# Patient Record
Sex: FEMALE | Race: WHITE | NOT HISPANIC OR LATINO | Employment: OTHER | URBAN - METROPOLITAN AREA
[De-identification: names, ages, dates, MRNs, and addresses within clinical notes are randomized per-mention and may not be internally consistent; named-entity substitution may affect disease eponyms.]

---

## 2024-01-03 PROBLEM — R55 SYNCOPE, VASOVAGAL: Status: ACTIVE | Noted: 2024-01-03

## 2024-01-03 PROBLEM — I47.19 PAROXYSMAL ATRIAL TACHYCARDIA (CMS-HCC): Status: ACTIVE | Noted: 2024-01-03

## 2024-01-03 PROBLEM — Z95.818 STATUS POST PLACEMENT OF IMPLANTABLE LOOP RECORDER: Status: ACTIVE | Noted: 2024-01-03

## 2024-01-03 PROBLEM — R07.89 CHEST TIGHTNESS: Status: ACTIVE | Noted: 2024-01-03

## 2024-01-03 PROBLEM — I65.29 CAROTID ARTERY STENOSIS: Status: ACTIVE | Noted: 2024-01-03

## 2024-01-03 PROBLEM — R00.2 PALPITATIONS: Status: ACTIVE | Noted: 2024-01-03

## 2024-01-03 PROBLEM — Z86.79 H/O ORTHOSTATIC HYPOTENSION: Status: ACTIVE | Noted: 2024-01-03

## 2024-01-03 PROBLEM — R06.02 SOB (SHORTNESS OF BREATH) ON EXERTION: Status: ACTIVE | Noted: 2024-01-03

## 2024-01-03 PROBLEM — I10 BENIGN ESSENTIAL HYPERTENSION: Status: ACTIVE | Noted: 2024-01-03

## 2024-01-03 RX ORDER — CHOLECALCIFEROL (VITAMIN D3) 25 MCG
1 TABLET ORAL DAILY
COMMUNITY

## 2024-01-03 RX ORDER — CHLORHEXIDINE GLUCONATE 4 %
3 LIQUID (ML) TOPICAL NIGHTLY PRN
COMMUNITY

## 2024-01-03 RX ORDER — FLUTICASONE PROPIONATE 50 MCG
1 SPRAY, SUSPENSION (ML) NASAL DAILY
COMMUNITY

## 2024-01-03 RX ORDER — ASCORBIC ACID 500 MG
1 TABLET ORAL DAILY
COMMUNITY

## 2024-01-03 RX ORDER — UBIDECARENONE 75 MG
500 CAPSULE ORAL DAILY
COMMUNITY

## 2024-01-03 RX ORDER — APIXABAN 5 MG/1
1 TABLET, FILM COATED ORAL 2 TIMES DAILY
COMMUNITY
End: 2024-01-23 | Stop reason: SDUPTHER

## 2024-01-03 RX ORDER — METOPROLOL SUCCINATE 50 MG/1
25 TABLET, EXTENDED RELEASE ORAL DAILY
COMMUNITY

## 2024-01-03 RX ORDER — VITS A,C,E/LUTEIN/MINERALS 300MCG-200
200 TABLET ORAL DAILY
COMMUNITY

## 2024-01-16 ENCOUNTER — APPOINTMENT (OUTPATIENT)
Dept: CARDIOLOGY | Facility: CLINIC | Age: 79
End: 2024-01-16
Payer: MEDICARE

## 2024-01-16 ENCOUNTER — APPOINTMENT (OUTPATIENT)
Dept: CARDIOLOGY | Facility: HOSPITAL | Age: 79
End: 2024-01-16
Payer: MEDICARE

## 2024-01-23 ENCOUNTER — HOSPITAL ENCOUNTER (OUTPATIENT)
Dept: CARDIOLOGY | Facility: HOSPITAL | Age: 79
Discharge: HOME | End: 2024-01-23
Payer: MEDICARE

## 2024-01-23 ENCOUNTER — OFFICE VISIT (OUTPATIENT)
Dept: CARDIOLOGY | Facility: CLINIC | Age: 79
End: 2024-01-23
Payer: MEDICARE

## 2024-01-23 VITALS
HEIGHT: 63 IN | BODY MASS INDEX: 28 KG/M2 | WEIGHT: 158 LBS | DIASTOLIC BLOOD PRESSURE: 62 MMHG | HEART RATE: 77 BPM | SYSTOLIC BLOOD PRESSURE: 108 MMHG

## 2024-01-23 DIAGNOSIS — I47.19 PAROXYSMAL ATRIAL TACHYCARDIA (CMS-HCC): Primary | ICD-10-CM

## 2024-01-23 DIAGNOSIS — I10 BENIGN ESSENTIAL HYPERTENSION: ICD-10-CM

## 2024-01-23 DIAGNOSIS — R55 SYNCOPE, VASOVAGAL: ICD-10-CM

## 2024-01-23 DIAGNOSIS — T50.1X5S: ICD-10-CM

## 2024-01-23 DIAGNOSIS — R55 SYNCOPE AND COLLAPSE: ICD-10-CM

## 2024-01-23 DIAGNOSIS — Z86.79 H/O ORTHOSTATIC HYPOTENSION: ICD-10-CM

## 2024-01-23 DIAGNOSIS — R07.89 CHEST TIGHTNESS: ICD-10-CM

## 2024-01-23 DIAGNOSIS — R00.2 PALPITATIONS: ICD-10-CM

## 2024-01-23 DIAGNOSIS — R06.02 SOB (SHORTNESS OF BREATH) ON EXERTION: ICD-10-CM

## 2024-01-23 DIAGNOSIS — Z95.818 STATUS POST PLACEMENT OF IMPLANTABLE LOOP RECORDER: ICD-10-CM

## 2024-01-23 PROCEDURE — 3074F SYST BP LT 130 MM HG: CPT | Performed by: NURSE PRACTITIONER

## 2024-01-23 PROCEDURE — 93291 INTERROG DEV EVAL SCRMS IP: CPT | Performed by: INTERNAL MEDICINE

## 2024-01-23 PROCEDURE — 3078F DIAST BP <80 MM HG: CPT | Performed by: NURSE PRACTITIONER

## 2024-01-23 PROCEDURE — 1036F TOBACCO NON-USER: CPT | Performed by: NURSE PRACTITIONER

## 2024-01-23 PROCEDURE — 99214 OFFICE O/P EST MOD 30 MIN: CPT | Performed by: NURSE PRACTITIONER

## 2024-01-23 PROCEDURE — 1159F MED LIST DOCD IN RCRD: CPT | Performed by: NURSE PRACTITIONER

## 2024-01-23 PROCEDURE — 93291 INTERROG DEV EVAL SCRMS IP: CPT

## 2024-01-23 RX ORDER — APIXABAN 5 MG/1
5 TABLET, FILM COATED ORAL 2 TIMES DAILY
Qty: 180 TABLET | Refills: 3 | Status: SHIPPED | OUTPATIENT
Start: 2024-01-23 | End: 2025-01-22

## 2024-01-23 NOTE — PROGRESS NOTES
"CARDIOLOGY OFFICE VISIT      CHIEF COMPLAINT  Chief Complaint   Patient presents with    Syncope     Complaint: \"I had a spell last Friday where I became weak and my heart began to race.\"  HISTORY OF PRESENT ILLNESS  HPI  History: The patient is a 78-year-old  female who is followed for paroxysmal atrial tachycardia and atrial fibrillation controlled on beta-blockade, magnesium oxide, and anticoagulated with Eliquis.  She underwent implantation of a loop recorder on 2020 for correlation of arrhythmia to symptom.  She presents the office today stating last Friday she had a spell where she suddenly became weak and her heart began to race.  She states again yesterday she had an episode of weakness.  Patient questions whether anything was identified on her loop recorder interrogation today.  She denies chest pain, dizziness, lightheadedness, near or yamilet syncope, shortness of breath, abdominal distention, or lower extremity edema.  Past Medical History  Past Medical History:   Diagnosis Date    Hypertension     Personal history of other specified conditions 2021    History of tachycardia       Social History  Social History     Tobacco Use    Smoking status: Former     Types: Cigarettes     Quit date:      Years since quittin.1    Smokeless tobacco: Never   Substance Use Topics    Alcohol use: Not Currently    Drug use: Never       Family History     Family History   Problem Relation Name Age of Onset    Coronary artery disease Sister      Coronary artery disease Brother          Allergies:  Allergies   Allergen Reactions    Bisacodyl Nausea Only    Ciprofloxacin Other    Codeine Nausea/vomiting     Other Reaction(s): nausea and vomiting    Doxycycline Nausea/vomiting     Other Reaction(s): vomiting    Oxybutynin Unknown     Other Reaction(s): hyper/wired    Penicillin G Sodium Unknown     Other Reaction(s): critical    Propoxyphene N-Acetaminophen Other        Outpatient " Medications:  Current Outpatient Medications   Medication Instructions    ascorbic acid (Vitamin C) 500 mg tablet 1 tablet, oral, Daily    cholecalciferol (Vitamin D-3) 25 MCG (1000 UT) tablet 1 tablet, oral, Daily    cyanocobalamin (VITAMIN B-12) 500 mcg, oral, Daily    Eliquis 5 mg tablet 1 tablet, oral, 2 times daily    fluticasone (Flonase) 50 mcg/actuation nasal spray 1 spray, Each Nostril, Daily    magnesium oxide (MAG-OX) 200 mg, oral, Daily    melatonin 12 mg tablet oral    metoprolol succinate XL (TOPROL-XL) 50 mg, oral          REVIEW OF SYSTEMS  Review of Systems   All other systems reviewed and are negative.        VITALS  There were no vitals filed for this visit.    PHYSICAL EXAM  Vitals and nursing note reviewed.   Constitutional:       Appearance: Normal appearance.   HENT:      Head: Normocephalic.   Neck:      Vascular: No JVD.   Cardiovascular:      Rate and Rhythm: Normal rate and regular rhythm.      Pulses: Normal pulses.      Heart sounds: Normal heart sounds.   Pulmonary:      Effort: Pulmonary effort is normal.      Breath sounds: Normal breath sounds.   Abdominal:      General: Bowel sounds are normal.      Palpations: Abdomen is soft.   Musculoskeletal:         General: Normal range of motion.      Cervical back: Normal range of motion.   Skin:     General: Skin is warm and dry.  Left parasternal loop recorder pocket is well-healed without redness swelling or drainage.  Neurological:      General: No focal deficit present.      Mental Status: She is alert and oriented to person, place, and time.      Motor: Motor function is intact.   Psychiatric:         Attention and Perception: Attention and perception normal.         Mood and Affect: Mood and affect normal.         Speech: Speech normal.         Behavior: Behavior normal. Behavior is cooperative.         Thought Content: Thought content normal.         Cognition and Memory: Cognition and memory normal.     Labs and testing: Twelve-lead  EKG reveals sinus rhythm without ectopics no acute ischemic changes.  QRS duration 74 ms,  ms, QTc 423 ms.  Loop recorder interrogation dated January 23, 2024 revealed a symptom episode on January 19, 2024 with corresponding electrogram revealing sinus rhythm with noise and low amplitude QRS.  No arrhythmia was identified.  Battery status is good.      ASSESSMENT AND PLAN    Clinical impressions:  1. Paroxysmal atrial tachycardia and atrial fibrillation controlled on beta-blockade and magnesium oxide and anticoagulated with Eliquis.  2. Head up tilt table test dated November 25, 2000 20+ for neurally mediated syncope with orthostatic blood pressure response with nitroglycerin provocation.  3. Normal coronaries per left heart catheterization dated November 3, 2020.  4. Loop recorder implant on November 25, 2020 ([employer] reveal Linq) for evaluation of arrhythmic burden.  5. Diagnostic EP study dated November 25, 2020 revealing normal conduction with no inducible arrhythmias.  6. History of diarrhea on high-dose magnesium oxide.  7. Overweight with a BMI of 27.99.    Recommendations:  1.  Continue current medications as prescribed.  2.  I reviewed the findings of the loop recorder.  No arrhythmias were noted to correlate with the patient's symptoms.  Patient will continue to undergo loop recorder downloads as scheduled.  She will undergo an in clinic check in 6 months prior to the office visit Dr. Sutherland.  3.  Follow-up in office with Dr. Sutherland in 6 months or sooner if needed.  4.  Continue lifestyle modifications as reinforced.    Evaluation and note by Lala Mccollum CNP  **Please excuse any errors in grammar or translation related to this dictation.  Voice recognition software was utilized to prepare this document.**

## 2024-02-02 ENCOUNTER — APPOINTMENT (OUTPATIENT)
Dept: CARDIOLOGY | Facility: CLINIC | Age: 79
End: 2024-02-02
Payer: MEDICARE

## 2024-02-02 ENCOUNTER — APPOINTMENT (OUTPATIENT)
Dept: CARDIOLOGY | Facility: HOSPITAL | Age: 79
End: 2024-02-02
Payer: MEDICARE

## 2024-03-15 ENCOUNTER — HOSPITAL ENCOUNTER (OUTPATIENT)
Dept: CARDIOLOGY | Facility: HOSPITAL | Age: 79
Discharge: HOME | End: 2024-03-15
Payer: MEDICARE

## 2024-03-15 DIAGNOSIS — R55 SYNCOPE AND COLLAPSE: ICD-10-CM

## 2024-03-15 DIAGNOSIS — Z95.818 PRESENCE OF OTHER CARDIAC IMPLANTS AND GRAFTS: Primary | ICD-10-CM

## 2024-03-15 DIAGNOSIS — Z95.818 PRESENCE OF OTHER CARDIAC IMPLANTS AND GRAFTS: ICD-10-CM

## 2024-03-15 PROCEDURE — 93298 REM INTERROG DEV EVAL SCRMS: CPT | Performed by: INTERNAL MEDICINE

## 2024-03-15 PROCEDURE — 93298 REM INTERROG DEV EVAL SCRMS: CPT

## 2024-03-19 ENCOUNTER — OFFICE VISIT (OUTPATIENT)
Dept: CARDIOLOGY | Facility: CLINIC | Age: 79
End: 2024-03-19
Payer: MEDICARE

## 2024-03-19 VITALS
DIASTOLIC BLOOD PRESSURE: 62 MMHG | HEART RATE: 68 BPM | BODY MASS INDEX: 28.35 KG/M2 | SYSTOLIC BLOOD PRESSURE: 118 MMHG | HEIGHT: 63 IN | WEIGHT: 160 LBS

## 2024-03-19 DIAGNOSIS — Z79.01 CURRENT USE OF LONG TERM ANTICOAGULATION: ICD-10-CM

## 2024-03-19 DIAGNOSIS — I47.19 PAROXYSMAL ATRIAL TACHYCARDIA (CMS-HCC): ICD-10-CM

## 2024-03-19 DIAGNOSIS — Z45.09 ENCOUNTER FOR LOOP RECORDER AT END OF BATTERY LIFE: Primary | ICD-10-CM

## 2024-03-19 DIAGNOSIS — Z95.818 PRESENCE OF OTHER CARDIAC IMPLANTS AND GRAFTS: ICD-10-CM

## 2024-03-19 DIAGNOSIS — R07.89 CHEST TIGHTNESS: ICD-10-CM

## 2024-03-19 DIAGNOSIS — I10 BENIGN ESSENTIAL HYPERTENSION: ICD-10-CM

## 2024-03-19 DIAGNOSIS — I65.29 STENOSIS OF CAROTID ARTERY, UNSPECIFIED LATERALITY: ICD-10-CM

## 2024-03-19 DIAGNOSIS — R06.02 SOB (SHORTNESS OF BREATH) ON EXERTION: ICD-10-CM

## 2024-03-19 DIAGNOSIS — Z86.79 H/O ORTHOSTATIC HYPOTENSION: ICD-10-CM

## 2024-03-19 DIAGNOSIS — R55 SYNCOPE, VASOVAGAL: ICD-10-CM

## 2024-03-19 DIAGNOSIS — Z95.818 STATUS POST PLACEMENT OF IMPLANTABLE LOOP RECORDER: ICD-10-CM

## 2024-03-19 DIAGNOSIS — R00.2 PALPITATIONS: ICD-10-CM

## 2024-03-19 DIAGNOSIS — R55 SYNCOPE AND COLLAPSE: ICD-10-CM

## 2024-03-19 PROCEDURE — 1036F TOBACCO NON-USER: CPT | Performed by: NURSE PRACTITIONER

## 2024-03-19 PROCEDURE — 1159F MED LIST DOCD IN RCRD: CPT | Performed by: NURSE PRACTITIONER

## 2024-03-19 PROCEDURE — 99214 OFFICE O/P EST MOD 30 MIN: CPT | Performed by: NURSE PRACTITIONER

## 2024-03-19 PROCEDURE — 3074F SYST BP LT 130 MM HG: CPT | Performed by: NURSE PRACTITIONER

## 2024-03-19 PROCEDURE — 1160F RVW MEDS BY RX/DR IN RCRD: CPT | Performed by: NURSE PRACTITIONER

## 2024-03-19 PROCEDURE — 3078F DIAST BP <80 MM HG: CPT | Performed by: NURSE PRACTITIONER

## 2024-03-19 NOTE — PROGRESS NOTES
"CARDIOLOGY OFFICE VISIT      CHIEF COMPLAINT  Chief Complaint   Patient presents with    Device Check     Loop @  tobias     Chief complaint: \"My loop recorder needs to be changed.\"  HISTORY OF PRESENT ILLNESS  HPI  History: The patient is a 78-year-old  female who is followed for paroxysmal atrial tachycardia and atrial fibrillation controlled on beta-blockade, magnesium oxide, and anticoagulated with Eliquis.  She underwent implantation of a loop recorder in 2024 correlation of arrhythmia to symptom.  She presents to the office today as the device reached elective replacement as of March 15, 2024.  The most recent loop recorder interrogations have revealed no arrhythmic events to correlate with symptoms of palpitations.  The patient states that she prefers to have a loop recorder reimplanted because it gives her peace of mind.  She continues to experience transient palpitations which do not correlate with arrhythmias per loop recorder.  She denies chest pain, dizziness, lightheadedness, shortness of breath, abdominal distention, or lower extremity edema.  Past Medical History  Past Medical History:   Diagnosis Date    Hypertension     Personal history of other specified conditions 2021    History of tachycardia       Social History  Social History     Tobacco Use    Smoking status: Former     Types: Cigarettes     Quit date: 1960     Years since quittin.2    Smokeless tobacco: Never   Substance Use Topics    Alcohol use: Not Currently    Drug use: Never       Family History     Family History   Problem Relation Name Age of Onset    Coronary artery disease Sister      Coronary artery disease Brother          Allergies:  Allergies   Allergen Reactions    Acetaminophen Unknown    Bisacodyl Nausea Only    Ciprofloxacin Other    Codeine Nausea/vomiting     Other Reaction(s): nausea and vomiting    Doxycycline Nausea/vomiting     Other Reaction(s): vomiting    Formoterol Unknown    " Oxybutynin Unknown     Other Reaction(s): hyper/wired    Penicillin G Sodium Unknown     Other Reaction(s): critical    Propoxyphene N-Acetaminophen Other        Outpatient Medications:  Current Outpatient Medications   Medication Instructions    ascorbic acid (Vitamin C) 500 mg tablet 1 tablet, oral, Daily    cholecalciferol (Vitamin D-3) 25 MCG (1000 UT) tablet 1 tablet, oral, Daily    cyanocobalamin (VITAMIN B-12) 500 mcg, oral, Daily    Eliquis 5 mg, oral, 2 times daily    fluticasone (Flonase) 50 mcg/actuation nasal spray 1 spray, Each Nostril, Daily    magnesium oxide (MAG-OX) 200 mg, oral, Daily    melatonin 12 mg tablet oral    metoprolol succinate XL (TOPROL-XL) 25 mg, oral, Daily          REVIEW OF SYSTEMS  Review of Systems   All other systems reviewed and are negative.        VITALS  Vitals:    03/19/24 1508   BP: 118/62   Pulse: 68       PHYSICAL EXAM  Vitals and nursing note reviewed.   Constitutional:       Appearance: Normal appearance.   HENT:      Head: Normocephalic.   Neck:      Vascular: No JVD.   Cardiovascular:      Rate and Rhythm: Normal rate and regular rhythm.      Pulses: Normal pulses.      Heart sounds: Normal heart sounds.   Pulmonary:      Effort: Pulmonary effort is normal.      Breath sounds: Normal breath sounds.   Abdominal:      General: Bowel sounds are normal.      Palpations: Abdomen is soft.   Musculoskeletal:         General: Normal range of motion.      Cervical back: Normal range of motion.   Skin:     General: Skin is warm and dry.  Parasternal loop recorder pocket is well-healed without redness swelling or drainage.  Neurological:      General: No focal deficit present.      Mental Status: She is alert and oriented to person, place, and time.      Motor: Motor function is intact.   Psychiatric:         Attention and Perception: Attention and perception normal.         Mood and Affect: Mood and affect normal.         Speech: Speech normal.         Behavior: Behavior  normal. Behavior is cooperative.         Thought Content: Thought content normal.         Cognition and Memory: Cognition and memory normal.     Labs and testing: Twelve-lead EKG reveals sinus rhythm without ectopics no acute ischemic changes.  QRS duration 68 ms,  ms, QTc 404 ms.  Loop recorder interrogation dated March 15, 2024 reveals the device to be at RRT since March 13, 2024.  No arrhythmic events were noted.      ASSESSMENT AND PLAN      Clinical impressions:  1. Paroxysmal atrial tachycardia and atrial fibrillation controlled on beta-blockade and magnesium oxide and anticoagulated with Eliquis.  2. Head up tilt table test dated November 25, 2000 20+ for neurally mediated syncope with orthostatic blood pressure response with nitroglycerin provocation.  3. Normal coronaries per left heart catheterization dated November 3, 2020.  4. Loop recorder implant on November 25, 2020 ([employer] reveal Linq) for evaluation of arrhythmic burden.  At RRT on March 13, 2024.  5. Diagnostic EP study dated November 25, 2020 revealing normal conduction with no inducible arrhythmias.  6. History of diarrhea on high-dose magnesium oxide.  7. Overweight with a BMI of 28.34.     Recommendations:  1.  Patient will be scheduled for outpatient loop recorder change out with Dr. Sutherland as soon as possible.  Patient will hold Eliquis for 2 days prior to the procedure and take all other medications as prescribed the day of the procedure with small sip of water.  No food to eat or drink after midnight the day of the procedure.  2.  Follow-ups will be pending the clinical course.  3.  Follow-up in office with Dr. Sutherland on July 23, 2024 at 1:40 PM schedule or sooner if needed.    Evaluation and note by Lala Mccollum CNP  **Please excuse any errors in grammar or translation related to this dictation.  Voice recognition software was utilized to prepare this document.**

## 2024-03-19 NOTE — PATIENT INSTRUCTIONS
Hold Eliquis for 2 days before procedure.  Take all other medications as prescribed the day of the procedure with a sip of water.  No food to eat or drink after midnight the day of the procedure.

## 2024-03-19 NOTE — H&P (VIEW-ONLY)
"CARDIOLOGY OFFICE VISIT      CHIEF COMPLAINT  Chief Complaint   Patient presents with    Device Check     Loop @  tobias     Chief complaint: \"My loop recorder needs to be changed.\"  HISTORY OF PRESENT ILLNESS  HPI  History: The patient is a 78-year-old  female who is followed for paroxysmal atrial tachycardia and atrial fibrillation controlled on beta-blockade, magnesium oxide, and anticoagulated with Eliquis.  She underwent implantation of a loop recorder in 2024 correlation of arrhythmia to symptom.  She presents to the office today as the device reached elective replacement as of March 15, 2024.  The most recent loop recorder interrogations have revealed no arrhythmic events to correlate with symptoms of palpitations.  The patient states that she prefers to have a loop recorder reimplanted because it gives her peace of mind.  She continues to experience transient palpitations which do not correlate with arrhythmias per loop recorder.  She denies chest pain, dizziness, lightheadedness, shortness of breath, abdominal distention, or lower extremity edema.  Past Medical History  Past Medical History:   Diagnosis Date    Hypertension     Personal history of other specified conditions 2021    History of tachycardia       Social History  Social History     Tobacco Use    Smoking status: Former     Types: Cigarettes     Quit date: 1960     Years since quittin.2    Smokeless tobacco: Never   Substance Use Topics    Alcohol use: Not Currently    Drug use: Never       Family History     Family History   Problem Relation Name Age of Onset    Coronary artery disease Sister      Coronary artery disease Brother          Allergies:  Allergies   Allergen Reactions    Acetaminophen Unknown    Bisacodyl Nausea Only    Ciprofloxacin Other    Codeine Nausea/vomiting     Other Reaction(s): nausea and vomiting    Doxycycline Nausea/vomiting     Other Reaction(s): vomiting    Formoterol Unknown    " Oxybutynin Unknown     Other Reaction(s): hyper/wired    Penicillin G Sodium Unknown     Other Reaction(s): critical    Propoxyphene N-Acetaminophen Other        Outpatient Medications:  Current Outpatient Medications   Medication Instructions    ascorbic acid (Vitamin C) 500 mg tablet 1 tablet, oral, Daily    cholecalciferol (Vitamin D-3) 25 MCG (1000 UT) tablet 1 tablet, oral, Daily    cyanocobalamin (VITAMIN B-12) 500 mcg, oral, Daily    Eliquis 5 mg, oral, 2 times daily    fluticasone (Flonase) 50 mcg/actuation nasal spray 1 spray, Each Nostril, Daily    magnesium oxide (MAG-OX) 200 mg, oral, Daily    melatonin 12 mg tablet oral    metoprolol succinate XL (TOPROL-XL) 25 mg, oral, Daily          REVIEW OF SYSTEMS  Review of Systems   All other systems reviewed and are negative.        VITALS  Vitals:    03/19/24 1508   BP: 118/62   Pulse: 68       PHYSICAL EXAM  Vitals and nursing note reviewed.   Constitutional:       Appearance: Normal appearance.   HENT:      Head: Normocephalic.   Neck:      Vascular: No JVD.   Cardiovascular:      Rate and Rhythm: Normal rate and regular rhythm.      Pulses: Normal pulses.      Heart sounds: Normal heart sounds.   Pulmonary:      Effort: Pulmonary effort is normal.      Breath sounds: Normal breath sounds.   Abdominal:      General: Bowel sounds are normal.      Palpations: Abdomen is soft.   Musculoskeletal:         General: Normal range of motion.      Cervical back: Normal range of motion.   Skin:     General: Skin is warm and dry.  Parasternal loop recorder pocket is well-healed without redness swelling or drainage.  Neurological:      General: No focal deficit present.      Mental Status: She is alert and oriented to person, place, and time.      Motor: Motor function is intact.   Psychiatric:         Attention and Perception: Attention and perception normal.         Mood and Affect: Mood and affect normal.         Speech: Speech normal.         Behavior: Behavior  normal. Behavior is cooperative.         Thought Content: Thought content normal.         Cognition and Memory: Cognition and memory normal.     Labs and testing: Twelve-lead EKG reveals sinus rhythm without ectopics no acute ischemic changes.  QRS duration 68 ms,  ms, QTc 404 ms.  Loop recorder interrogation dated March 15, 2024 reveals the device to be at RRT since March 13, 2024.  No arrhythmic events were noted.      ASSESSMENT AND PLAN      Clinical impressions:  1. Paroxysmal atrial tachycardia and atrial fibrillation controlled on beta-blockade and magnesium oxide and anticoagulated with Eliquis.  2. Head up tilt table test dated November 25, 2000 20+ for neurally mediated syncope with orthostatic blood pressure response with nitroglycerin provocation.  3. Normal coronaries per left heart catheterization dated November 3, 2020.  4. Loop recorder implant on November 25, 2020 ([employer] reveal Linq) for evaluation of arrhythmic burden.  At RRT on March 13, 2024.  5. Diagnostic EP study dated November 25, 2020 revealing normal conduction with no inducible arrhythmias.  6. History of diarrhea on high-dose magnesium oxide.  7. Overweight with a BMI of 28.34.     Recommendations:  1.  Patient will be scheduled for outpatient loop recorder change out with Dr. Sutherland as soon as possible.  Patient will hold Eliquis for 2 days prior to the procedure and take all other medications as prescribed the day of the procedure with small sip of water.  No food to eat or drink after midnight the day of the procedure.  2.  Follow-ups will be pending the clinical course.  3.  Follow-up in office with Dr. Sutherland on July 23, 2024 at 1:40 PM schedule or sooner if needed.    Evaluation and note by Lala Mccollum CNP  **Please excuse any errors in grammar or translation related to this dictation.  Voice recognition software was utilized to prepare this document.**

## 2024-03-29 ENCOUNTER — HOSPITAL ENCOUNTER (OUTPATIENT)
Dept: CARDIOLOGY | Facility: HOSPITAL | Age: 79
Discharge: HOME | End: 2024-03-29
Payer: MEDICARE

## 2024-03-29 DIAGNOSIS — Z95.818 PRESENCE OF OTHER CARDIAC IMPLANTS AND GRAFTS: ICD-10-CM

## 2024-03-29 DIAGNOSIS — R55 SYNCOPE AND COLLAPSE: ICD-10-CM

## 2024-04-04 ENCOUNTER — APPOINTMENT (OUTPATIENT)
Dept: CARDIOLOGY | Facility: CLINIC | Age: 79
End: 2024-04-04
Payer: MEDICARE

## 2024-04-04 LAB
NON-UH HIE ANION GAP:SCNC:PT:SER/PLAS:QN:: 9 MEQ/L (ref 6–16)
NON-UH HIE CALCIUM:MCNC:PT:SER/PLAS:QN:: 9.5 MG/DL (ref 8.9–11.1)
NON-UH HIE CARBON DIOXIDE:SCNC:PT:SER/PLAS:QN:: 28 MMOL/L (ref 21–31)
NON-UH HIE CHLORIDE:SCNC:PT:SER/PLAS:QN:: 101 MMOL/L (ref 101–111)
NON-UH HIE COAGULATION TISSUE FACTOR INDUCED.INR:RELTIME:PT:PPP:QN:COAG: 1.31
NON-UH HIE COAGULATION TISSUE FACTOR INDUCED:TIME:PT:PPP:QN:COAG: 14.7 SECOND(S) (ref 9.4–12.5)
NON-UH HIE CREATININE:MCNC:PT:SER/PLAS:QN:: 0.7 MG/DL (ref 0.5–1.3)
NON-UH HIE EGFR: 89 ML/MIN/1.73 M2
NON-UH HIE ERYTHROCYTE DISTRIBUTION WIDTH:RATIO:PT:RBC:QN:AUTOMATED COUNT: 12.8 % (ref 10.9–14.2)
NON-UH HIE ERYTHROCYTE MEAN CORPUSCULAR HEMOGLOBIN CONCENTRATION:MCNC:PT:RB: 32.2 GM/DL (ref 31.4–36)
NON-UH HIE ERYTHROCYTE MEAN CORPUSCULAR HEMOGLOBIN:ENTMASS:PT:RBC:QN:AUTOMA: 31 PG (ref 27–34)
NON-UH HIE ERYTHROCYTE MEAN CORPUSCULAR VOLUME:ENTVOL:PT:RBC:QN:AUTOMATED C: 96.2 FL (ref 80–100)
NON-UH HIE ERYTHROCYTE SIZE:MORPH:PT:BLD:NOM:: NORMAL
NON-UH HIE ERYTHROCYTES:NCNC:PT:BLD:QN:AUTOMATED COUNT: 4 E12/L (ref 4.3–5.9)
NON-UH HIE GLUCOSE:MCNC:PT:SER/PLAS:QN:: 84 MG/DL (ref 55–199)
NON-UH HIE HEMATOCRIT:VFR:PT:BLD:QN:AUTOMATED COUNT: 38.6 % (ref 34–46)
NON-UH HIE HEMOGLOBIN:MCNC:PT:BLD:QN:: 12.4 GM/DL (ref 12–16)
NON-UH HIE LEUKOCYTES: 4.7 E9/L (ref 4–11)
NON-UH HIE PLATELET MEAN VOLUME:ENTVOL:PT:BLD:QN:AUTOMATED COUNT: 8.9 FL (ref 6.4–10.8)
NON-UH HIE PLATELET: 331 E9/L (ref 150–500)
NON-UH HIE POTASSIUM:SCNC:PT:SER/PLAS:QN:: 4.2 MMOL/L (ref 3.5–5.3)
NON-UH HIE SODIUM:SCNC:PT:SER/PLAS:QN:: 134 MMOL/L (ref 135–145)
NON-UH HIE UREA NITROGEN/CREATININE:MRTO:PT:SER/PLAS:QN:: 13 NO UNITS (ref 10–20)
NON-UH HIE UREA NITROGEN:MCNC:PT:SER/PLAS:QN:: 9 MG/DL (ref 5–21)

## 2024-04-05 ENCOUNTER — HOSPITAL ENCOUNTER (OUTPATIENT)
Dept: CARDIOLOGY | Facility: HOSPITAL | Age: 79
Discharge: HOME | End: 2024-04-05
Payer: MEDICARE

## 2024-04-05 ENCOUNTER — APPOINTMENT (OUTPATIENT)
Dept: CARDIOLOGY | Facility: CLINIC | Age: 79
End: 2024-04-05
Payer: MEDICARE

## 2024-04-05 DIAGNOSIS — R55 SYNCOPE AND COLLAPSE: ICD-10-CM

## 2024-04-05 DIAGNOSIS — Z95.818 PRESENCE OF OTHER CARDIAC IMPLANTS AND GRAFTS: ICD-10-CM

## 2024-04-11 ENCOUNTER — HOSPITAL ENCOUNTER (OUTPATIENT)
Facility: HOSPITAL | Age: 79
Setting detail: OUTPATIENT SURGERY
Discharge: HOME | End: 2024-04-11
Attending: INTERNAL MEDICINE | Admitting: INTERNAL MEDICINE
Payer: MEDICARE

## 2024-04-11 ENCOUNTER — HOSPITAL ENCOUNTER (OUTPATIENT)
Dept: CARDIOLOGY | Facility: HOSPITAL | Age: 79
Setting detail: OUTPATIENT SURGERY
Discharge: HOME | End: 2024-04-11
Payer: MEDICARE

## 2024-04-11 VITALS
SYSTOLIC BLOOD PRESSURE: 140 MMHG | OXYGEN SATURATION: 98 % | WEIGHT: 160.94 LBS | HEART RATE: 67 BPM | HEIGHT: 63 IN | TEMPERATURE: 96.1 F | RESPIRATION RATE: 16 BRPM | DIASTOLIC BLOOD PRESSURE: 64 MMHG | BODY MASS INDEX: 28.52 KG/M2

## 2024-04-11 DIAGNOSIS — Z45.09 ENCOUNTER FOR LOOP RECORDER AT END OF BATTERY LIFE: Primary | ICD-10-CM

## 2024-04-11 DIAGNOSIS — Z95.818 STATUS POST PLACEMENT OF IMPLANTABLE LOOP RECORDER: ICD-10-CM

## 2024-04-11 DIAGNOSIS — R55 SYNCOPE, VASOVAGAL: ICD-10-CM

## 2024-04-11 LAB
ANION GAP SERPL CALC-SCNC: 10 MMOL/L (ref 10–20)
APTT PPP: 35 SECONDS (ref 27–38)
ATRIAL RATE: 67 BPM
BUN SERPL-MCNC: 7 MG/DL (ref 6–23)
CALCIUM SERPL-MCNC: 9.1 MG/DL (ref 8.6–10.3)
CHLORIDE SERPL-SCNC: 104 MMOL/L (ref 98–107)
CO2 SERPL-SCNC: 27 MMOL/L (ref 21–32)
CREAT SERPL-MCNC: 0.75 MG/DL (ref 0.5–1.05)
EGFRCR SERPLBLD CKD-EPI 2021: 83 ML/MIN/1.73M*2
ERYTHROCYTE [DISTWIDTH] IN BLOOD BY AUTOMATED COUNT: 12.5 % (ref 11.5–14.5)
GLUCOSE SERPL-MCNC: 88 MG/DL (ref 74–99)
HCT VFR BLD AUTO: 39.3 % (ref 36–46)
HGB BLD-MCNC: 13.1 G/DL (ref 12–16)
INR PPP: 1.1 (ref 0.9–1.1)
MCH RBC QN AUTO: 31.9 PG (ref 26–34)
MCHC RBC AUTO-ENTMCNC: 33.3 G/DL (ref 32–36)
MCV RBC AUTO: 96 FL (ref 80–100)
NRBC BLD-RTO: 0 /100 WBCS (ref 0–0)
P AXIS: 12 DEGREES
P OFFSET: 210 MS
P ONSET: 158 MS
PLATELET # BLD AUTO: 317 X10*3/UL (ref 150–450)
POTASSIUM SERPL-SCNC: 4.3 MMOL/L (ref 3.5–5.3)
PR INTERVAL: 144 MS
PROTHROMBIN TIME: 12.2 SECONDS (ref 9.8–12.8)
Q ONSET: 230 MS
QRS COUNT: 11 BEATS
QRS DURATION: 70 MS
QT INTERVAL: 400 MS
QTC CALCULATION(BAZETT): 422 MS
QTC FREDERICIA: 415 MS
R AXIS: 93 DEGREES
RBC # BLD AUTO: 4.11 X10*6/UL (ref 4–5.2)
SODIUM SERPL-SCNC: 137 MMOL/L (ref 136–145)
T AXIS: 56 DEGREES
T OFFSET: 430 MS
VENTRICULAR RATE: 67 BPM
WBC # BLD AUTO: 4.5 X10*3/UL (ref 4.4–11.3)

## 2024-04-11 PROCEDURE — 33286 RMVL SUBQ CAR RHYTHM MNTR: CPT | Performed by: INTERNAL MEDICINE

## 2024-04-11 PROCEDURE — 33285 INSJ SUBQ CAR RHYTHM MNTR: CPT | Performed by: INTERNAL MEDICINE

## 2024-04-11 PROCEDURE — 2500000001 HC RX 250 WO HCPCS SELF ADMINISTERED DRUGS (ALT 637 FOR MEDICARE OP): Performed by: NURSE PRACTITIONER

## 2024-04-11 PROCEDURE — 36415 COLL VENOUS BLD VENIPUNCTURE: CPT | Performed by: NURSE PRACTITIONER

## 2024-04-11 PROCEDURE — 76000 FLUOROSCOPY <1 HR PHYS/QHP: CPT | Mod: 59 | Performed by: INTERNAL MEDICINE

## 2024-04-11 PROCEDURE — C1764 EVENT RECORDER, CARDIAC: HCPCS | Performed by: INTERNAL MEDICINE

## 2024-04-11 PROCEDURE — 2500000005 HC RX 250 GENERAL PHARMACY W/O HCPCS: Performed by: INTERNAL MEDICINE

## 2024-04-11 PROCEDURE — 80048 BASIC METABOLIC PNL TOTAL CA: CPT | Performed by: NURSE PRACTITIONER

## 2024-04-11 PROCEDURE — 7100000009 HC PHASE TWO TIME - INITIAL BASE CHARGE: Performed by: INTERNAL MEDICINE

## 2024-04-11 PROCEDURE — 93285 PRGRMG DEV EVAL SCRMS IP: CPT | Performed by: INTERNAL MEDICINE

## 2024-04-11 PROCEDURE — 2720000007 HC OR 272 NO HCPCS: Performed by: INTERNAL MEDICINE

## 2024-04-11 PROCEDURE — 2500000004 HC RX 250 GENERAL PHARMACY W/ HCPCS (ALT 636 FOR OP/ED): Performed by: NURSE PRACTITIONER

## 2024-04-11 PROCEDURE — 85610 PROTHROMBIN TIME: CPT | Performed by: NURSE PRACTITIONER

## 2024-04-11 PROCEDURE — 93005 ELECTROCARDIOGRAM TRACING: CPT | Mod: 59

## 2024-04-11 PROCEDURE — 7100000010 HC PHASE TWO TIME - EACH INCREMENTAL 1 MINUTE: Performed by: INTERNAL MEDICINE

## 2024-04-11 PROCEDURE — 2500000004 HC RX 250 GENERAL PHARMACY W/ HCPCS (ALT 636 FOR OP/ED): Performed by: INTERNAL MEDICINE

## 2024-04-11 PROCEDURE — 85027 COMPLETE CBC AUTOMATED: CPT | Performed by: NURSE PRACTITIONER

## 2024-04-11 PROCEDURE — 2780000003 HC OR 278 NO HCPCS: Performed by: INTERNAL MEDICINE

## 2024-04-11 PROCEDURE — 99152 MOD SED SAME PHYS/QHP 5/>YRS: CPT | Performed by: INTERNAL MEDICINE

## 2024-04-11 PROCEDURE — 93291 INTERROG DEV EVAL SCRMS IP: CPT | Performed by: INTERNAL MEDICINE

## 2024-04-11 DEVICE — ICM LNQ22 LINQ II USA
Type: IMPLANTABLE DEVICE | Site: CHEST | Status: FUNCTIONAL
Brand: LINQ II™

## 2024-04-11 RX ORDER — MIDAZOLAM HYDROCHLORIDE 1 MG/ML
INJECTION, SOLUTION INTRAMUSCULAR; INTRAVENOUS AS NEEDED
Status: DISCONTINUED | OUTPATIENT
Start: 2024-04-11 | End: 2024-04-11 | Stop reason: HOSPADM

## 2024-04-11 RX ORDER — MUPIROCIN 20 MG/G
1 OINTMENT TOPICAL ONCE
Status: COMPLETED | OUTPATIENT
Start: 2024-04-11 | End: 2024-04-11

## 2024-04-11 RX ORDER — LIDOCAINE HYDROCHLORIDE 10 MG/ML
INJECTION, SOLUTION EPIDURAL; INFILTRATION; INTRACAUDAL; PERINEURAL AS NEEDED
Status: DISCONTINUED | OUTPATIENT
Start: 2024-04-11 | End: 2024-04-11 | Stop reason: HOSPADM

## 2024-04-11 RX ORDER — CHLORHEXIDINE GLUCONATE 40 MG/ML
SOLUTION TOPICAL ONCE
Status: COMPLETED | OUTPATIENT
Start: 2024-04-11 | End: 2024-04-11

## 2024-04-11 RX ORDER — FENTANYL CITRATE 50 UG/ML
INJECTION, SOLUTION INTRAMUSCULAR; INTRAVENOUS AS NEEDED
Status: DISCONTINUED | OUTPATIENT
Start: 2024-04-11 | End: 2024-04-11 | Stop reason: HOSPADM

## 2024-04-11 RX ORDER — ONDANSETRON HYDROCHLORIDE 2 MG/ML
INJECTION, SOLUTION INTRAVENOUS AS NEEDED
Status: DISCONTINUED | OUTPATIENT
Start: 2024-04-11 | End: 2024-04-11 | Stop reason: HOSPADM

## 2024-04-11 RX ORDER — SODIUM CHLORIDE 9 MG/ML
20 INJECTION, SOLUTION INTRAVENOUS CONTINUOUS
Status: DISCONTINUED | OUTPATIENT
Start: 2024-04-11 | End: 2024-04-11 | Stop reason: HOSPADM

## 2024-04-11 RX ORDER — VANCOMYCIN HYDROCHLORIDE 1 G/200ML
1000 INJECTION, SOLUTION INTRAVENOUS ONCE
Status: COMPLETED | OUTPATIENT
Start: 2024-04-11 | End: 2024-04-11

## 2024-04-11 RX ADMIN — VANCOMYCIN HYDROCHLORIDE 1000 MG: 1 INJECTION, SOLUTION INTRAVENOUS at 11:25

## 2024-04-11 RX ADMIN — Medication: at 09:45

## 2024-04-11 RX ADMIN — SODIUM CHLORIDE 20 ML/HR: 9 INJECTION, SOLUTION INTRAVENOUS at 09:44

## 2024-04-11 RX ADMIN — MUPIROCIN 1 APPLICATION: 20 OINTMENT TOPICAL at 09:44

## 2024-04-11 ASSESSMENT — COLUMBIA-SUICIDE SEVERITY RATING SCALE - C-SSRS
6. HAVE YOU EVER DONE ANYTHING, STARTED TO DO ANYTHING, OR PREPARED TO DO ANYTHING TO END YOUR LIFE?: NO
1. IN THE PAST MONTH, HAVE YOU WISHED YOU WERE DEAD OR WISHED YOU COULD GO TO SLEEP AND NOT WAKE UP?: NO
2. HAVE YOU ACTUALLY HAD ANY THOUGHTS OF KILLING YOURSELF?: NO
2. HAVE YOU ACTUALLY HAD ANY THOUGHTS OF KILLING YOURSELF?: NO
6. HAVE YOU EVER DONE ANYTHING, STARTED TO DO ANYTHING, OR PREPARED TO DO ANYTHING TO END YOUR LIFE?: NO

## 2024-04-11 NOTE — NURSING NOTE
Patient states understanding of discharge instructions as give via teach back. Follow up appointments site care and medications reviewed with patient and daughter.

## 2024-04-11 NOTE — DISCHARGE INSTRUCTIONS
DISCHARGE INSTRUCTIONS FOR LOOP RECORDER    ACTIVITY  DO NOT drive a car for 24 hrs.  DO NOT drive until cleared by your provider if you have had a recent passing out spell or previously restricted from driving.  DO NOT operate power equipment/heavy machinery for 24 hrs.  DO NOT sign any legal documents for 24 hrs.  DO NOT drink alcohol for 24 hrs.    WOUND CARE  DO NOT remove the water resistant dressing.  Keep the dressing clean and dry.  May shower in 24 hrs.   Avoid letting the water directly hit the wound.  May apply ice to the wound for comfort/swelling intermittently 20 mins on and 20 mins off for 24-48 hours.  May take Tylenol or Motrin for further pain relief.    CALL YOUR PHYSICIAN IMMEDIATELY FOR:  Wound edges that are gaping.  Wound that is red, swollen, painful or has foul smelling drainage.  Excessive bright red bleeding or saturated dressing.  Increased pain not controlled by medication.  Chills/fever over 100 degrees F.    REMOTE MONITORING/DEVICE INFO  You have been instructed by the device company representative regarding remote home monitoring.   Please follow the instructions provided to you about your specific device.  You will be given a temporary device ID card.  You will receive a permanent device ID card in the mail in 6-8 weeks and should keep this with you at all times.  If you have questions, please contact the [facility] device clinic for further instruction [phone].      Follow up in the [facility] office in 1 week for a wound check/dressing removal as scheduled above.  Follow up with the Electrophysiology service as scheduled above.

## 2024-04-11 NOTE — INTERVAL H&P NOTE
H&P reviewed. The patient was examined and there are no changes to the H&P.  In addition,  She has recurrent AF.  She held Eliquis x 3 days.    Shared decision making performed.  All questions answered.  Econsent obtained.

## 2024-04-18 ENCOUNTER — CLINICAL SUPPORT (OUTPATIENT)
Dept: CARDIOLOGY | Facility: CLINIC | Age: 79
End: 2024-04-18
Payer: MEDICARE

## 2024-04-18 VITALS
BODY MASS INDEX: 28.35 KG/M2 | HEART RATE: 84 BPM | HEIGHT: 63 IN | SYSTOLIC BLOOD PRESSURE: 138 MMHG | WEIGHT: 160 LBS | DIASTOLIC BLOOD PRESSURE: 60 MMHG | TEMPERATURE: 98.5 F

## 2024-04-18 DIAGNOSIS — Z45.09 ENCOUNTER FOR LOOP RECORDER AT END OF BATTERY LIFE: ICD-10-CM

## 2024-04-18 DIAGNOSIS — I47.19 PAROXYSMAL ATRIAL TACHYCARDIA (CMS-HCC): ICD-10-CM

## 2024-04-18 DIAGNOSIS — Z95.818 STATUS POST PLACEMENT OF IMPLANTABLE LOOP RECORDER: ICD-10-CM

## 2024-04-18 PROCEDURE — 99024 POSTOP FOLLOW-UP VISIT: CPT | Performed by: INTERNAL MEDICINE

## 2024-04-18 NOTE — PROGRESS NOTES
"Patient here for a Wound check ordered by Dr. Sutherland due to loop recorder change. Dr. Bell in suite. Medication list Updated verbally.NO cardiac complaints. Wound covered with steri strips, no discharge or healing ecchymosis noted at wound site.    Discussed with Uyen Schmitt RN prior to discharge.     To Dr. Bell, Dr. Sutherland for review        Vitals:    04/18/24 1542   BP: 138/60   BP Location: Left arm   Patient Position: Sitting   Pulse: 84   Temp: 36.9 °C (98.5 °F)   Weight: 72.6 kg (160 lb)   Height: 1.6 m (5' 3\")       "

## 2024-05-17 ENCOUNTER — HOSPITAL ENCOUNTER (OUTPATIENT)
Dept: CARDIOLOGY | Facility: HOSPITAL | Age: 79
Discharge: HOME | End: 2024-05-17
Payer: MEDICARE

## 2024-05-17 DIAGNOSIS — R55 SYNCOPE AND COLLAPSE: ICD-10-CM

## 2024-05-17 DIAGNOSIS — Z95.818 PRESENCE OF OTHER CARDIAC IMPLANTS AND GRAFTS: ICD-10-CM

## 2024-05-17 PROCEDURE — 93298 REM INTERROG DEV EVAL SCRMS: CPT

## 2024-06-21 ENCOUNTER — HOSPITAL ENCOUNTER (OUTPATIENT)
Dept: CARDIOLOGY | Facility: HOSPITAL | Age: 79
Discharge: HOME | End: 2024-06-21
Payer: MEDICARE

## 2024-06-21 DIAGNOSIS — Z95.818 PRESENCE OF OTHER CARDIAC IMPLANTS AND GRAFTS: ICD-10-CM

## 2024-06-21 DIAGNOSIS — R55 SYNCOPE AND COLLAPSE: ICD-10-CM

## 2024-06-21 PROCEDURE — 93298 REM INTERROG DEV EVAL SCRMS: CPT

## 2024-07-19 ENCOUNTER — HOSPITAL ENCOUNTER (OUTPATIENT)
Dept: CARDIOLOGY | Facility: HOSPITAL | Age: 79
Discharge: HOME | End: 2024-07-19
Payer: MEDICARE

## 2024-07-19 DIAGNOSIS — Z95.818 PRESENCE OF OTHER CARDIAC IMPLANTS AND GRAFTS: ICD-10-CM

## 2024-07-19 DIAGNOSIS — R55 SYNCOPE AND COLLAPSE: ICD-10-CM

## 2024-07-23 ENCOUNTER — HOSPITAL ENCOUNTER (OUTPATIENT)
Dept: CARDIOLOGY | Facility: HOSPITAL | Age: 79
Discharge: HOME | End: 2024-07-23
Payer: MEDICARE

## 2024-07-23 ENCOUNTER — APPOINTMENT (OUTPATIENT)
Dept: CARDIOLOGY | Facility: CLINIC | Age: 79
End: 2024-07-23
Payer: MEDICARE

## 2024-07-23 VITALS
BODY MASS INDEX: 28.35 KG/M2 | SYSTOLIC BLOOD PRESSURE: 148 MMHG | HEART RATE: 75 BPM | DIASTOLIC BLOOD PRESSURE: 86 MMHG | HEIGHT: 63 IN | WEIGHT: 160 LBS

## 2024-07-23 DIAGNOSIS — Z87.891 FORMER SMOKER: ICD-10-CM

## 2024-07-23 DIAGNOSIS — I47.19 PAROXYSMAL ATRIAL TACHYCARDIA (CMS-HCC): Primary | ICD-10-CM

## 2024-07-23 DIAGNOSIS — Z71.89 ENCOUNTER TO DISCUSS TREATMENT OPTIONS: ICD-10-CM

## 2024-07-23 DIAGNOSIS — R55 SYNCOPE, VASOVAGAL: ICD-10-CM

## 2024-07-23 DIAGNOSIS — Z95.818 STATUS POST PLACEMENT OF IMPLANTABLE LOOP RECORDER: ICD-10-CM

## 2024-07-23 DIAGNOSIS — Z45.09 ENCOUNTER FOR LOOP RECORDER AT END OF BATTERY LIFE: ICD-10-CM

## 2024-07-23 DIAGNOSIS — I10 BENIGN ESSENTIAL HYPERTENSION: ICD-10-CM

## 2024-07-23 DIAGNOSIS — Z79.01 LONG TERM CURRENT USE OF ANTICOAGULANT THERAPY: ICD-10-CM

## 2024-07-23 DIAGNOSIS — R00.2 PALPITATIONS: ICD-10-CM

## 2024-07-23 DIAGNOSIS — Z71.89 ENCOUNTER FOR MEDICATION REVIEW AND COUNSELING: ICD-10-CM

## 2024-07-23 PROCEDURE — 93291 INTERROG DEV EVAL SCRMS IP: CPT

## 2024-07-23 PROCEDURE — 1159F MED LIST DOCD IN RCRD: CPT | Performed by: INTERNAL MEDICINE

## 2024-07-23 PROCEDURE — 3077F SYST BP >= 140 MM HG: CPT | Performed by: INTERNAL MEDICINE

## 2024-07-23 PROCEDURE — 99214 OFFICE O/P EST MOD 30 MIN: CPT | Performed by: INTERNAL MEDICINE

## 2024-07-23 PROCEDURE — 93000 ELECTROCARDIOGRAM COMPLETE: CPT | Performed by: INTERNAL MEDICINE

## 2024-07-23 PROCEDURE — 3079F DIAST BP 80-89 MM HG: CPT | Performed by: INTERNAL MEDICINE

## 2024-07-23 PROCEDURE — 1036F TOBACCO NON-USER: CPT | Performed by: INTERNAL MEDICINE

## 2024-07-23 RX ORDER — METOPROLOL SUCCINATE 50 MG/1
25 TABLET, EXTENDED RELEASE ORAL DAILY
Qty: 45 TABLET | Refills: 3 | Status: SHIPPED | OUTPATIENT
Start: 2024-07-23

## 2024-07-23 ASSESSMENT — ENCOUNTER SYMPTOMS
PALPITATIONS: 0
DYSPNEA ON EXERTION: 0

## 2024-07-23 NOTE — PROGRESS NOTES
"Chief Complaint:   Follow-up (6 month follow up.)     History Of Present Illness:    Lilliana Bryan is a 78 y.o. female presenting with followup.     Patient denies any arrhythmia symptoms of prolonged palpitation, lightheadedness, near syncope, or syncope.    She has rare palpitation that are brief in duration.    She is stressed.  Her daughter is in [place] and can't travel back to [place] due to computer IT issue and airlines.    Last Recorded Vitals:  Vitals:    07/23/24 1358   BP: 140/86   BP Location: Left arm   Patient Position: Sitting   Pulse: 75   Weight: 72.6 kg (160 lb)   Height: 1.6 m (5' 3\")       Past Medical History:  See List     Past Surgical History:  See List       Social History:  She reports that she quit smoking about 64 years ago. Her smoking use included cigarettes. She has never used smokeless tobacco. She reports that she does not currently use alcohol. She reports that she does not use drugs.    Family History:  Family History   Problem Relation Name Age of Onset    Coronary artery disease Sister      Coronary artery disease Brother          Allergies:  Acetaminophen, Bisacodyl, Ciprofloxacin, Codeine, Doxycycline, Formoterol, Oxybutynin, Penicillin g sodium, and Propoxyphene n-acetaminophen    Outpatient Medications:  Current Outpatient Medications   Medication Instructions    ascorbic acid (Vitamin C) 500 mg tablet 1 tablet, oral, Daily    cholecalciferol (Vitamin D-3) 25 MCG (1000 UT) tablet 1 tablet, oral, Daily    cyanocobalamin (VITAMIN B-12) 500 mcg, oral, Daily    Eliquis 5 mg, oral, 2 times daily    fluticasone (Flonase) 50 mcg/actuation nasal spray 1 spray, Each Nostril, Daily    magnesium oxide (MAG-OX) 200 mg, oral, Daily    melatonin 3 mg, oral, Nightly PRN    metoprolol succinate XL (TOPROL-XL) 25 mg, oral, Daily   Review of Systems   Cardiovascular:  Negative for chest pain, dyspnea on exertion and palpitations.   All other systems reviewed and are negative.        Physical " "Exam:  Constitutional:       General: Awake.      Appearance: Normal and healthy appearance. Well-developed and not in distress.   Neck:      Vascular: No JVR. JVD normal.   Pulmonary:      Effort: Pulmonary effort is normal.      Breath sounds: Normal breath sounds. No wheezing. No rhonchi. No rales.   Chest:      Chest wall: Not tender to palpatation.      Comments: Loop recorder in place  Cardiovascular:      PMI at left midclavicular line. Normal rate. Regular rhythm. Normal S1. Normal S2.       Murmurs: There is no murmur.      No gallop.  No click. No rub.   Pulses:     Intact distal pulses.   Edema:     Peripheral edema absent.   Abdominal:      Tenderness: There is no abdominal tenderness.   Musculoskeletal: Normal range of motion.         General: No tenderness. Skin:     General: Skin is warm and dry.   Neurological:      General: No focal deficit present.      Mental Status: Alert and oriented to person, place and time.            Last Labs:  CBC -  Lab Results   Component Value Date    WBC 4.5 04/11/2024    HGB 13.1 04/11/2024    HCT 39.3 04/11/2024    MCV 96 04/11/2024     04/11/2024       CMP -  Lab Results   Component Value Date    CALCIUM 9.1 04/11/2024       LIPID PANEL -   No results found for: \"CHOL\", \"TRIG\", \"HDL\", \"CHHDL\", \"LDLF\", \"VLDL\", \"NHDL\"    RENAL FUNCTION PANEL -   Lab Results   Component Value Date    GLUCOSE 88 04/11/2024     04/11/2024    K 4.3 04/11/2024     04/11/2024    CO2 27 04/11/2024    ANIONGAP 10 04/11/2024    BUN 7 04/11/2024    CREATININE 0.75 04/11/2024    CALCIUM 9.1 04/11/2024        No results found for: \"BNP\", \"HGBA1C\"    Last Cardiology Tests:  ECG:  ECG 12 lead STAT 04/11/2024    Today. NSR. Normal axis. Qtc 410 ms.    Device check today. [employer] LNQ22 loop recorder. Battery ok. Symptoms with NSR  sinus tachycardia.    Lab review: I have personally reviewed the laboratory result(s) see above    Assessment/Plan   Diagnoses and all orders for this " visit:  Paroxysmal atrial tachycardia (CMS-HCC)  Status post placement of implantable loop recorder  Encounter for loop recorder at end of battery life  Syncope, vasovagal  Palpitations  Benign essential hypertension  Long term current use of anticoagulant therapy  Former smoker  BMI 28.0-28.9,adult  Encounter for medication review and counseling  Encounter to discuss treatment options        Marisela Clark RN    Recurrent syncope. Likely vasovagal etiology and orthostatic etiology by history. Abnormal tilt table test for neurally mediated syncope with orthostatic blood pressure response. No significant arrhythmias except sinus tachycardia have been recorded to date.   EP study November 2020. Normal conduction. No inducible arrhythmias. Reviewed medication. Continue metoprolol. Didn't take Florinef. Declined compression stockings  Status post loop recorder 2020. [employer] LNQ11 1 loop recorder. Recurrent syncope with loop recorder and has shown no arrhythmias to date. Normal device function. Battery okay. Continue to monitor clinically. Ordered device checks.   Paroxysmal atrial fibrillation atrial tachycardia. Chronic. Stable. No events by loop recorder. Reviewed medications. Continue medications. Refills discussed  High risk medication Eliquis. Continue long-term. Discussed refill.  Hypertension. Chronic. Stable. Medications. Continue medications. Refills discussed  History of atypical chest pain with marked family history of coronary disease. Negative cardiac catheterization 2020  Former tobacco use.  History of diarrhea on high-dose magnesium oxide. Able to tolerate low-dose magnesium oxide  Overweight  AHA recommendations for exercise, diet, and behavioral modification reviewed with pt.     Counseling over 50% visit performed. The patient and I discussed the mechanism of arrhythmia, syncope, ECG, atrial fibrillation, anticoagulation, loop recorder longevity, loop recorder check, device clinic, standard of  care for device followup, treatment options, risks, benefits, and imponderables. American Heart Association lifestyle changes and behavioral modification discussed. All questions answered in detail.  Patient appreciative of care

## 2024-07-23 NOTE — PATIENT INSTRUCTIONS
Continue same medications/treatment.  Patient educated on proper medication use.  Patient educated on risk factor modification.  Please bring any lab results from other providers/physicians to your next appointment.    Please bring all medicines, vitamins, and herbal supplements with you when you come to the office.    Prescriptions will not be filled unless you are compliant with your follow up appointments or have a follow up appointment scheduled as per instruction of your physician. Refills should be requested at the time of your visit.    Follow up with Lala in 6 months with device check  Continue monthly remote checks    LYLE VASQUES RN, AM SCRIBING FOR AND IN THE PRESENCE OF DR. CARLYN BALDWIN MD, FACC, FACP, FHRS

## 2024-07-26 ENCOUNTER — HOSPITAL ENCOUNTER (OUTPATIENT)
Dept: CARDIOLOGY | Facility: HOSPITAL | Age: 79
Discharge: HOME | End: 2024-07-26
Payer: MEDICARE

## 2024-07-26 DIAGNOSIS — Z95.818 PRESENCE OF OTHER CARDIAC IMPLANTS AND GRAFTS: ICD-10-CM

## 2024-07-26 DIAGNOSIS — R55 SYNCOPE AND COLLAPSE: ICD-10-CM

## 2024-08-30 ENCOUNTER — HOSPITAL ENCOUNTER (OUTPATIENT)
Dept: CARDIOLOGY | Facility: HOSPITAL | Age: 79
Discharge: HOME | End: 2024-08-30
Payer: MEDICARE

## 2024-08-30 DIAGNOSIS — R55 SYNCOPE AND COLLAPSE: ICD-10-CM

## 2024-08-30 DIAGNOSIS — Z95.818 PRESENCE OF OTHER CARDIAC IMPLANTS AND GRAFTS: ICD-10-CM

## 2024-08-30 PROCEDURE — 93298 REM INTERROG DEV EVAL SCRMS: CPT

## 2024-09-06 ENCOUNTER — HOSPITAL ENCOUNTER (OUTPATIENT)
Dept: CARDIOLOGY | Facility: HOSPITAL | Age: 79
Discharge: HOME | End: 2024-09-06
Payer: MEDICARE

## 2024-09-06 DIAGNOSIS — Z95.818 PRESENCE OF OTHER CARDIAC IMPLANTS AND GRAFTS: ICD-10-CM

## 2024-09-06 DIAGNOSIS — R55 SYNCOPE AND COLLAPSE: ICD-10-CM

## 2024-10-04 ENCOUNTER — HOSPITAL ENCOUNTER (OUTPATIENT)
Dept: CARDIOLOGY | Facility: HOSPITAL | Age: 79
Discharge: HOME | End: 2024-10-04
Payer: MEDICARE

## 2024-10-04 DIAGNOSIS — R55 SYNCOPE AND COLLAPSE: ICD-10-CM

## 2024-10-04 DIAGNOSIS — Z95.818 PRESENCE OF OTHER CARDIAC IMPLANTS AND GRAFTS: ICD-10-CM

## 2024-10-04 PROCEDURE — 93298 REM INTERROG DEV EVAL SCRMS: CPT

## 2024-10-18 ENCOUNTER — HOSPITAL ENCOUNTER (OUTPATIENT)
Dept: CARDIOLOGY | Facility: HOSPITAL | Age: 79
Discharge: HOME | End: 2024-10-18
Payer: MEDICARE

## 2024-10-18 DIAGNOSIS — Z95.818 PRESENCE OF OTHER CARDIAC IMPLANTS AND GRAFTS: ICD-10-CM

## 2024-10-18 DIAGNOSIS — R55 SYNCOPE AND COLLAPSE: ICD-10-CM

## 2024-11-08 ENCOUNTER — HOSPITAL ENCOUNTER (OUTPATIENT)
Dept: CARDIOLOGY | Facility: HOSPITAL | Age: 79
Discharge: HOME | End: 2024-11-08
Payer: MEDICARE

## 2024-11-08 DIAGNOSIS — Z95.818 PRESENCE OF OTHER CARDIAC IMPLANTS AND GRAFTS: ICD-10-CM

## 2024-11-08 DIAGNOSIS — R55 SYNCOPE AND COLLAPSE: ICD-10-CM

## 2024-11-08 PROCEDURE — 93298 REM INTERROG DEV EVAL SCRMS: CPT

## 2024-12-13 ENCOUNTER — HOSPITAL ENCOUNTER (OUTPATIENT)
Dept: CARDIOLOGY | Facility: HOSPITAL | Age: 79
Discharge: HOME | End: 2024-12-13
Payer: MEDICARE

## 2024-12-13 DIAGNOSIS — R55 SYNCOPE AND COLLAPSE: ICD-10-CM

## 2024-12-13 DIAGNOSIS — Z95.818 PRESENCE OF OTHER CARDIAC IMPLANTS AND GRAFTS: ICD-10-CM

## 2024-12-13 PROCEDURE — 93298 REM INTERROG DEV EVAL SCRMS: CPT

## 2024-12-20 ENCOUNTER — HOSPITAL ENCOUNTER (OUTPATIENT)
Dept: CARDIOLOGY | Facility: HOSPITAL | Age: 79
Discharge: HOME | End: 2024-12-20
Payer: MEDICARE

## 2024-12-20 DIAGNOSIS — Z95.818 PRESENCE OF OTHER CARDIAC IMPLANTS AND GRAFTS: ICD-10-CM

## 2024-12-20 DIAGNOSIS — R55 SYNCOPE AND COLLAPSE: ICD-10-CM

## 2025-01-17 ENCOUNTER — HOSPITAL ENCOUNTER (OUTPATIENT)
Dept: CARDIOLOGY | Facility: HOSPITAL | Age: 80
Discharge: HOME | End: 2025-01-17
Payer: MEDICARE

## 2025-01-17 DIAGNOSIS — Z95.818 PRESENCE OF OTHER CARDIAC IMPLANTS AND GRAFTS: ICD-10-CM

## 2025-01-17 DIAGNOSIS — R55 SYNCOPE AND COLLAPSE: ICD-10-CM

## 2025-01-17 PROCEDURE — 93298 REM INTERROG DEV EVAL SCRMS: CPT

## 2025-01-27 ENCOUNTER — APPOINTMENT (OUTPATIENT)
Dept: CARDIOLOGY | Facility: CLINIC | Age: 80
End: 2025-01-27
Payer: MEDICARE

## 2025-01-27 ENCOUNTER — APPOINTMENT (OUTPATIENT)
Dept: CARDIOLOGY | Facility: HOSPITAL | Age: 80
End: 2025-01-27
Payer: MEDICARE

## 2025-01-28 ENCOUNTER — APPOINTMENT (OUTPATIENT)
Dept: CARDIOLOGY | Facility: CLINIC | Age: 80
End: 2025-01-28
Payer: MEDICARE

## 2025-01-28 ENCOUNTER — APPOINTMENT (OUTPATIENT)
Dept: CARDIOLOGY | Facility: HOSPITAL | Age: 80
End: 2025-01-28
Payer: MEDICARE

## 2025-02-07 ENCOUNTER — HOSPITAL ENCOUNTER (OUTPATIENT)
Dept: CARDIOLOGY | Facility: HOSPITAL | Age: 80
Discharge: HOME | End: 2025-02-07
Payer: MEDICARE

## 2025-02-07 ENCOUNTER — APPOINTMENT (OUTPATIENT)
Dept: CARDIOLOGY | Facility: CLINIC | Age: 80
End: 2025-02-07
Payer: MEDICARE

## 2025-02-07 VITALS
WEIGHT: 165 LBS | HEART RATE: 80 BPM | SYSTOLIC BLOOD PRESSURE: 126 MMHG | HEIGHT: 63 IN | DIASTOLIC BLOOD PRESSURE: 84 MMHG | BODY MASS INDEX: 29.23 KG/M2

## 2025-02-07 DIAGNOSIS — Z79.01 LONG TERM CURRENT USE OF ANTICOAGULANT THERAPY: ICD-10-CM

## 2025-02-07 DIAGNOSIS — Z95.818 STATUS POST PLACEMENT OF IMPLANTABLE LOOP RECORDER: ICD-10-CM

## 2025-02-07 DIAGNOSIS — R55 SYNCOPE, VASOVAGAL: ICD-10-CM

## 2025-02-07 DIAGNOSIS — I47.19 PAROXYSMAL ATRIAL TACHYCARDIA (CMS-HCC): ICD-10-CM

## 2025-02-07 DIAGNOSIS — Z95.818 PRESENCE OF OTHER CARDIAC IMPLANTS AND GRAFTS: Primary | ICD-10-CM

## 2025-02-07 DIAGNOSIS — Z71.2 ENCOUNTER TO DISCUSS TEST RESULTS: ICD-10-CM

## 2025-02-07 DIAGNOSIS — Z71.89 ENCOUNTER FOR MEDICATION REVIEW AND COUNSELING: ICD-10-CM

## 2025-02-07 DIAGNOSIS — R07.89 CHEST TIGHTNESS: ICD-10-CM

## 2025-02-07 DIAGNOSIS — Z45.09 ENCOUNTER FOR LOOP RECORDER AT END OF BATTERY LIFE: ICD-10-CM

## 2025-02-07 DIAGNOSIS — R06.02 SOB (SHORTNESS OF BREATH) ON EXERTION: ICD-10-CM

## 2025-02-07 DIAGNOSIS — Z87.891 FORMER SMOKER: ICD-10-CM

## 2025-02-07 DIAGNOSIS — R00.2 PALPITATIONS: ICD-10-CM

## 2025-02-07 PROCEDURE — 3074F SYST BP LT 130 MM HG: CPT | Performed by: INTERNAL MEDICINE

## 2025-02-07 PROCEDURE — 3079F DIAST BP 80-89 MM HG: CPT | Performed by: INTERNAL MEDICINE

## 2025-02-07 PROCEDURE — 93291 INTERROG DEV EVAL SCRMS IP: CPT

## 2025-02-07 PROCEDURE — 1159F MED LIST DOCD IN RCRD: CPT | Performed by: INTERNAL MEDICINE

## 2025-02-07 PROCEDURE — 99215 OFFICE O/P EST HI 40 MIN: CPT | Performed by: INTERNAL MEDICINE

## 2025-02-07 PROCEDURE — 93291 INTERROG DEV EVAL SCRMS IP: CPT | Performed by: INTERNAL MEDICINE

## 2025-02-07 PROCEDURE — 1036F TOBACCO NON-USER: CPT | Performed by: INTERNAL MEDICINE

## 2025-02-07 PROCEDURE — 93000 ELECTROCARDIOGRAM COMPLETE: CPT | Mod: DISTINCT PROCEDURAL SERVICE | Performed by: INTERNAL MEDICINE

## 2025-02-07 RX ORDER — CHLORHEXIDINE GLUCONATE 40 MG/ML
SOLUTION TOPICAL ONCE
OUTPATIENT
Start: 2025-02-07 | End: 2025-02-07

## 2025-02-07 RX ORDER — SODIUM CHLORIDE 9 MG/ML
100 INJECTION, SOLUTION INTRAVENOUS CONTINUOUS
OUTPATIENT
Start: 2025-02-07 | End: 2025-02-08

## 2025-02-07 RX ORDER — VANCOMYCIN HYDROCHLORIDE 1 G/200ML
1000 INJECTION, SOLUTION INTRAVENOUS ONCE
OUTPATIENT
Start: 2025-02-07 | End: 2025-02-07

## 2025-02-07 RX ORDER — NITROGLYCERIN 0.4 MG/1
0.4 TABLET SUBLINGUAL EVERY 5 MIN PRN
Qty: 25 TABLET | Refills: 5 | Status: SHIPPED | OUTPATIENT
Start: 2025-02-07 | End: 2026-02-07

## 2025-02-07 RX ORDER — MUPIROCIN 20 MG/G
1 OINTMENT TOPICAL ONCE
OUTPATIENT
Start: 2025-02-07 | End: 2025-02-07

## 2025-02-07 RX ORDER — METOPROLOL TARTRATE 25 MG/1
25 TABLET, FILM COATED ORAL AS NEEDED
Qty: 30 TABLET | Refills: 11 | Status: SHIPPED | OUTPATIENT
Start: 2025-02-07 | End: 2026-02-07

## 2025-02-07 ASSESSMENT — ENCOUNTER SYMPTOMS
SHORTNESS OF BREATH: 0
SNORING: 0
PND: 0
ORTHOPNEA: 0
CLAUDICATION: 0
IRREGULAR HEARTBEAT: 0
NEAR-SYNCOPE: 0
COUGH: 0
DYSPNEA ON EXERTION: 1
SYNCOPE: 0
PALPITATIONS: 1
WHEEZING: 0

## 2025-02-07 NOTE — PATIENT INSTRUCTIONS
"      Pre-Procedure Patient Information    You have been scheduled for: Loop recorder explant, and implant new loop recorder  At: [facility]  With: Dr. Sutherland   Date of procedure:     1. Please have transportation to and from the hospital. While you should plan for same-day discharge there is a possibility you will need to stay overnight.    2. You will receive a call from the hospital 24 - 72 hours before your procedure providing you with fasting instructions, procedure location detail, and time of arrival.  If you have not received a call from the hospital by 6 pm the day before your scheduled procedure, please call [phone].    3. Please bring a current list of medications with you to the hospital.      4. Medications to hold:    HOLD Eliquis for 3 days prior to procedure         - Otherwise, you may continue your medications in the morning with sips of water.     5. Nothing to eat after midnight before the procedure. OK to take morning medications, with above exceptions, the day of the procedure with a small sip of water.     6. Please bring to our attention if you have any contrast, latex or metal allergies.    7. Please have your blood work as instructed completed at least a day before your procedure.    8. If you have any questions, please contact the office at [phone].    Follow up 7 days after for an incision check    Dr. Sutherland has sent in a \"pill in the pocket\" Metoprolol Tartrate prescription.  You can take 1 tab a day for sustained palpitations lasting 15 minutes or more.  This is in addition to your regularly scheduled Metoprolol Succinate, which is long acting.    "

## 2025-02-07 NOTE — PROGRESS NOTES
"Chief Complaint:   Follow-up (Patient is present for 6 month follow up with device check)     History Of Present Illness:    Lilliana Bryan is a 79 y.o. female presenting with follow-up.  Device is at EOS since December 2024.    Patient denies any arrhythmia symptoms of lightheadedness, near syncope, or syncope.  He has had episodes of rapid palpitation and the device has not picked that up.    We discussed limitations of device based on battery longevity as well as programmed settings.    Last Recorded Vitals:  Vitals:    02/07/25 1210   BP: 126/84   BP Location: Left arm   Patient Position: Sitting   Pulse: 80   Weight: 74.8 kg (165 lb)   Height: 1.6 m (5' 3\")       Past Medical History:  See list  Past Surgical History:  See list    Social History:  She reports that she quit smoking about 65 years ago. Her smoking use included cigarettes. She has never used smokeless tobacco. She reports that she does not currently use alcohol. She reports that she does not use drugs.    Family History:  Family History   Problem Relation Name Age of Onset    Coronary artery disease Sister      Coronary artery disease Brother          Allergies:  Acetaminophen, Bisacodyl, Ciprofloxacin, Codeine, Doxycycline, Formoterol, Oxybutynin, Penicillin g sodium, Propoxyphene n-acetaminophen, and Nitrofurantoin monohyd/m-cryst    Outpatient Medications:  Current Outpatient Medications   Medication Instructions    ascorbic acid (Vitamin C) 500 mg tablet 1 tablet, Daily    cholecalciferol (Vitamin D-3) 25 MCG (1000 UT) tablet 1 tablet, Daily    cyanocobalamin (VITAMIN B-12) 500 mcg, Daily    Eliquis 5 mg, oral, 2 times daily    fluticasone (Flonase) 50 mcg/actuation nasal spray 1 spray, Daily    magnesium oxide (MAG-OX) 200 mg, Daily    melatonin 3 mg, Nightly PRN    metoprolol succinate XL (TOPROL-XL) 25 mg, oral, Daily   Review of Systems   Constitutional: Negative for malaise/fatigue.   Cardiovascular:  Positive for dyspnea on exertion and " "palpitations. Negative for claudication, cyanosis, irregular heartbeat, leg swelling, near-syncope, orthopnea, paroxysmal nocturnal dyspnea and syncope.   Respiratory:  Negative for cough, shortness of breath, snoring and wheezing.    All other systems reviewed and are negative.        Physical Exam:  Constitutional:       Appearance: Normal and healthy appearance. Well-developed, overweight and not in distress.      Comments: Left sided loop recorder site well healed   Neck:      Vascular: No JVR. JVD normal.   Pulmonary:      Effort: Pulmonary effort is normal.      Breath sounds: Normal breath sounds. No wheezing. No rhonchi. No rales.   Chest:      Chest wall: Not tender to palpatation.   Cardiovascular:      PMI at left midclavicular line. Normal rate. Regular rhythm. Normal S1. Normal S2.       Murmurs: There is no murmur.      No gallop.  No click. No rub.   Pulses:     Intact distal pulses.   Edema:     Peripheral edema absent.   Abdominal:      Tenderness: There is no abdominal tenderness.   Musculoskeletal: Normal range of motion.         General: No tenderness. Skin:     General: Skin is warm and dry.   Neurological:      General: No focal deficit present.      Mental Status: Alert and oriented to person, place and time.            Last Labs:  CBC -  Lab Results   Component Value Date    WBC 4.5 04/11/2024    HGB 13.1 04/11/2024    HCT 39.3 04/11/2024    MCV 96 04/11/2024     04/11/2024       CMP -  Lab Results   Component Value Date    CALCIUM 9.1 04/11/2024       LIPID PANEL -   No results found for: \"CHOL\", \"TRIG\", \"HDL\", \"CHHDL\", \"LDLF\", \"VLDL\", \"NHDL\"    RENAL FUNCTION PANEL -   Lab Results   Component Value Date    GLUCOSE 88 04/11/2024     04/11/2024    K 4.3 04/11/2024     04/11/2024    CO2 27 04/11/2024    ANIONGAP 10 04/11/2024    BUN 7 04/11/2024    CREATININE 0.75 04/11/2024    CALCIUM 9.1 04/11/2024        No results found for: \"BNP\", \"HGBA1C\"    Last Cardiology " Tests:  ECG:  ECG 12 lead (Clinic Performed) 07/23/2024    Today.  Normal sinus rhythm.  PVCs.  PACs.  Right axis deviation.  Corrected QT interval 420 ms    Lab review: I have personally reviewed the laboratory result(s) see above    Assessment/Plan   Diagnoses and all orders for this visit:  Presence of other cardiac implants and grafts  -     ECG 12 lead (Clinic Performed)  Encounter for loop recorder at end of battery life  Palpitations  Paroxysmal atrial tachycardia (CMS-HCC)  SOB (shortness of breath) on exertion  Status post placement of implantable loop recorder  Syncope, vasovagal  Long term current use of anticoagulant therapy  Encounter for medication review and counseling  Former smoker  Encounter to discuss test results  Body mass index (BMI) of 29.0 to 29.9 in adult  Chest tightness        Renetta Lester LPN      Recurrent syncope. Likely vasovagal etiology and orthostatic etiology by history. Abnormal tilt table test for neurally mediated syncope with orthostatic blood pressure response. No significant arrhythmias except sinus tachycardia have been recorded to date.   EP study November 2020. Normal conduction. No inducible arrhythmias. Reviewed medication.  Continue medications.  Discussed refills  status post loop recorder 2020 then replaced in 2024.  [employer] NQ22 device at replacement indicator.  Preoperative cardiac evaluation performed.  Shared decision making performed.  [place] decision tool.  E consent obtained.  Paroxysmal atrial fibrillation atrial tachycardia. Chronic. Stable. No events by loop recorder. Reviewed medications. Continue medications.  Discussed refills  High risk medication Eliquis.  Hold for 3 days prior to procedure.  Discussed refill.  Hypertension. Chronic. Stable. Medications. Continue medications.  Discussed refills.  History of atypical chest pain with marked family history of coronary disease. Negative cardiac catheterization 2020  Former tobacco  use.  History of diarrhea on high-dose magnesium oxide. Able to tolerate low-dose magnesium oxide  Overweight  AHA recommendations for exercise, diet, and behavioral modification reviewed with pt.     Counseling over 50% visit performed. The patient and I discussed the mechanism of arrhythmia, syncope, ECG, atrial fibrillation, preop cardiac evaluation, indication for medications and if refills needs, econsent, treatment options, risks, benefits, and imponderables. American Heart Association lifestyle changes and behavioral modification discussed. All questions answered in detail.  Patient appreciative of care

## 2025-02-07 NOTE — H&P (VIEW-ONLY)
"Chief Complaint:   Follow-up (Patient is present for 6 month follow up with device check)     History Of Present Illness:    Lilliana Bryan is a 79 y.o. female presenting with follow-up.  Device is at EOS since December 2024.    Patient denies any arrhythmia symptoms of lightheadedness, near syncope, or syncope.  He has had episodes of rapid palpitation and the device has not picked that up.    We discussed limitations of device based on battery longevity as well as programmed settings.    Last Recorded Vitals:  Vitals:    02/07/25 1210   BP: 126/84   BP Location: Left arm   Patient Position: Sitting   Pulse: 80   Weight: 74.8 kg (165 lb)   Height: 1.6 m (5' 3\")       Past Medical History:  See list  Past Surgical History:  See list    Social History:  She reports that she quit smoking about 65 years ago. Her smoking use included cigarettes. She has never used smokeless tobacco. She reports that she does not currently use alcohol. She reports that she does not use drugs.    Family History:  Family History   Problem Relation Name Age of Onset    Coronary artery disease Sister      Coronary artery disease Brother          Allergies:  Acetaminophen, Bisacodyl, Ciprofloxacin, Codeine, Doxycycline, Formoterol, Oxybutynin, Penicillin g sodium, Propoxyphene n-acetaminophen, and Nitrofurantoin monohyd/m-cryst    Outpatient Medications:  Current Outpatient Medications   Medication Instructions    ascorbic acid (Vitamin C) 500 mg tablet 1 tablet, Daily    cholecalciferol (Vitamin D-3) 25 MCG (1000 UT) tablet 1 tablet, Daily    cyanocobalamin (VITAMIN B-12) 500 mcg, Daily    Eliquis 5 mg, oral, 2 times daily    fluticasone (Flonase) 50 mcg/actuation nasal spray 1 spray, Daily    magnesium oxide (MAG-OX) 200 mg, Daily    melatonin 3 mg, Nightly PRN    metoprolol succinate XL (TOPROL-XL) 25 mg, oral, Daily   Review of Systems   Constitutional: Negative for malaise/fatigue.   Cardiovascular:  Positive for dyspnea on exertion and " "palpitations. Negative for claudication, cyanosis, irregular heartbeat, leg swelling, near-syncope, orthopnea, paroxysmal nocturnal dyspnea and syncope.   Respiratory:  Negative for cough, shortness of breath, snoring and wheezing.    All other systems reviewed and are negative.        Physical Exam:  Constitutional:       Appearance: Normal and healthy appearance. Well-developed, overweight and not in distress.      Comments: Left sided loop recorder site well healed   Neck:      Vascular: No JVR. JVD normal.   Pulmonary:      Effort: Pulmonary effort is normal.      Breath sounds: Normal breath sounds. No wheezing. No rhonchi. No rales.   Chest:      Chest wall: Not tender to palpatation.   Cardiovascular:      PMI at left midclavicular line. Normal rate. Regular rhythm. Normal S1. Normal S2.       Murmurs: There is no murmur.      No gallop.  No click. No rub.   Pulses:     Intact distal pulses.   Edema:     Peripheral edema absent.   Abdominal:      Tenderness: There is no abdominal tenderness.   Musculoskeletal: Normal range of motion.         General: No tenderness. Skin:     General: Skin is warm and dry.   Neurological:      General: No focal deficit present.      Mental Status: Alert and oriented to person, place and time.            Last Labs:  CBC -  Lab Results   Component Value Date    WBC 4.5 04/11/2024    HGB 13.1 04/11/2024    HCT 39.3 04/11/2024    MCV 96 04/11/2024     04/11/2024       CMP -  Lab Results   Component Value Date    CALCIUM 9.1 04/11/2024       LIPID PANEL -   No results found for: \"CHOL\", \"TRIG\", \"HDL\", \"CHHDL\", \"LDLF\", \"VLDL\", \"NHDL\"    RENAL FUNCTION PANEL -   Lab Results   Component Value Date    GLUCOSE 88 04/11/2024     04/11/2024    K 4.3 04/11/2024     04/11/2024    CO2 27 04/11/2024    ANIONGAP 10 04/11/2024    BUN 7 04/11/2024    CREATININE 0.75 04/11/2024    CALCIUM 9.1 04/11/2024        No results found for: \"BNP\", \"HGBA1C\"    Last Cardiology " Tests:  ECG:  ECG 12 lead (Clinic Performed) 07/23/2024    Today.  Normal sinus rhythm.  PVCs.  PACs.  Right axis deviation.  Corrected QT interval 420 ms    Lab review: I have personally reviewed the laboratory result(s) see above    Assessment/Plan   Diagnoses and all orders for this visit:  Presence of other cardiac implants and grafts  -     ECG 12 lead (Clinic Performed)  Encounter for loop recorder at end of battery life  Palpitations  Paroxysmal atrial tachycardia (CMS-HCC)  SOB (shortness of breath) on exertion  Status post placement of implantable loop recorder  Syncope, vasovagal  Long term current use of anticoagulant therapy  Encounter for medication review and counseling  Former smoker  Encounter to discuss test results  Body mass index (BMI) of 29.0 to 29.9 in adult  Chest tightness        Renetta Lester LPN      Recurrent syncope. Likely vasovagal etiology and orthostatic etiology by history. Abnormal tilt table test for neurally mediated syncope with orthostatic blood pressure response. No significant arrhythmias except sinus tachycardia have been recorded to date.   EP study November 2020. Normal conduction. No inducible arrhythmias. Reviewed medication.  Continue medications.  Discussed refills  status post loop recorder 2020 then replaced in 2024.  [employer] NQ22 device at replacement indicator.  Preoperative cardiac evaluation performed.  Shared decision making performed.  [place] decision tool.  E consent obtained.  Paroxysmal atrial fibrillation atrial tachycardia. Chronic. Stable. No events by loop recorder. Reviewed medications. Continue medications.  Discussed refills  High risk medication Eliquis.  Hold for 3 days prior to procedure.  Discussed refill.  Hypertension. Chronic. Stable. Medications. Continue medications.  Discussed refills.  History of atypical chest pain with marked family history of coronary disease. Negative cardiac catheterization 2020  Former tobacco  use.  History of diarrhea on high-dose magnesium oxide. Able to tolerate low-dose magnesium oxide  Overweight  AHA recommendations for exercise, diet, and behavioral modification reviewed with pt.     Counseling over 50% visit performed. The patient and I discussed the mechanism of arrhythmia, syncope, ECG, atrial fibrillation, preop cardiac evaluation, indication for medications and if refills needs, econsent, treatment options, risks, benefits, and imponderables. American Heart Association lifestyle changes and behavioral modification discussed. All questions answered in detail.  Patient appreciative of care

## 2025-02-09 LAB
NON-UH HIE INFLUENZAE A AG: NEGATIVE
NON-UH HIE INFLUENZAE B AG: NEGATIVE
NON-UH HIE RAPID COV INT NEG CTL: NORMAL
NON-UH HIE RAPID COV INT POS CTL: NORMAL
NON-UH HIE SARS CORONAVIRUS+SARS CORONAVIRUS 2 AG:PRTHR:PT:RESPIRATORY:ORD:: NOT DETECTED

## 2025-02-17 LAB
NON-UH HIE ANION GAP:SCNC:PT:SER/PLAS:QN:: 10 MEQ/L (ref 6–16)
NON-UH HIE CALCIUM:MCNC:PT:SER/PLAS:QN:: 9.1 MG/DL (ref 8.9–11.1)
NON-UH HIE CARBON DIOXIDE:SCNC:PT:SER/PLAS:QN:: 29 MMOL/L (ref 21–31)
NON-UH HIE CHLORIDE:SCNC:PT:SER/PLAS:QN:: 100 MMOL/L (ref 101–111)
NON-UH HIE COAGULATION SURFACE INDUCED:TIME:PT:PPP:QN:COAG: 37.8 SECOND(S) (ref 25.1–36.5)
NON-UH HIE COAGULATION TISSUE FACTOR INDUCED.INR:RELTIME:PT:PPP:QN:COAG: 1.26
NON-UH HIE COAGULATION TISSUE FACTOR INDUCED:TIME:PT:PPP:QN:COAG: 14.2 SECOND(S) (ref 9.4–12.5)
NON-UH HIE CREATININE:MCNC:PT:SER/PLAS:QN:: 0.7 MG/DL (ref 0.5–1.3)
NON-UH HIE EGFR: 89 ML/MIN/1.73 M2
NON-UH HIE ERYTHROCYTE DISTRIBUTION WIDTH:RATIO:PT:RBC:QN:AUTOMATED COUNT: 13.1 % (ref 10.9–14.2)
NON-UH HIE ERYTHROCYTE MEAN CORPUSCULAR HEMOGLOBIN CONCENTRATION:MCNC:PT:RB: 34.9 GM/DL (ref 31.4–36)
NON-UH HIE ERYTHROCYTE MEAN CORPUSCULAR HEMOGLOBIN:ENTMASS:PT:RBC:QN:AUTOMA: 33.2 PG (ref 27–34)
NON-UH HIE ERYTHROCYTE MEAN CORPUSCULAR VOLUME:ENTVOL:PT:RBC:QN:AUTOMATED C: 94.9 FL (ref 80–100)
NON-UH HIE ERYTHROCYTE SIZE:MORPH:PT:BLD:NOM:: NORMAL
NON-UH HIE ERYTHROCYTES:NCNC:PT:BLD:QN:AUTOMATED COUNT: 4 E12/L (ref 4.3–5.9)
NON-UH HIE GLUCOSE:MCNC:PT:SER/PLAS:QN:: 115 MG/DL (ref 55–199)
NON-UH HIE HEMATOCRIT:VFR:PT:BLD:QN:AUTOMATED COUNT: 38.3 % (ref 34–46)
NON-UH HIE HEMOGLOBIN:MCNC:PT:BLD:QN:: 13.4 GM/DL (ref 12–16)
NON-UH HIE LEUKOCYTES: 6.5 E9/L (ref 4–11)
NON-UH HIE PLATELET MEAN VOLUME:ENTVOL:PT:BLD:QN:AUTOMATED COUNT: 8 FL (ref 6.4–10.8)
NON-UH HIE PLATELET: 407 E9/L (ref 150–500)
NON-UH HIE POTASSIUM:SCNC:PT:SER/PLAS:QN:: 4.1 MMOL/L (ref 3.5–5.3)
NON-UH HIE SODIUM:SCNC:PT:SER/PLAS:QN:: 135 MMOL/L (ref 135–145)
NON-UH HIE UREA NITROGEN/CREATININE:MRTO:PT:SER/PLAS:QN:: 14 NO UNITS (ref 10–20)
NON-UH HIE UREA NITROGEN:MCNC:PT:SER/PLAS:QN:: 10 MG/DL (ref 5–21)

## 2025-02-21 ENCOUNTER — HOSPITAL ENCOUNTER (OUTPATIENT)
Dept: CARDIOLOGY | Facility: HOSPITAL | Age: 80
Discharge: HOME | End: 2025-02-21
Payer: MEDICARE

## 2025-02-21 DIAGNOSIS — R55 SYNCOPE AND COLLAPSE: ICD-10-CM

## 2025-02-21 DIAGNOSIS — Z95.818 PRESENCE OF OTHER CARDIAC IMPLANTS AND GRAFTS: ICD-10-CM

## 2025-02-27 ENCOUNTER — HOSPITAL ENCOUNTER (OUTPATIENT)
Facility: HOSPITAL | Age: 80
Setting detail: OUTPATIENT SURGERY
Discharge: HOME | End: 2025-02-27
Attending: INTERNAL MEDICINE | Admitting: INTERNAL MEDICINE
Payer: MEDICARE

## 2025-02-27 ENCOUNTER — APPOINTMENT (OUTPATIENT)
Dept: CARDIOLOGY | Facility: HOSPITAL | Age: 80
End: 2025-02-27
Payer: MEDICARE

## 2025-02-27 VITALS
TEMPERATURE: 97.2 F | SYSTOLIC BLOOD PRESSURE: 146 MMHG | DIASTOLIC BLOOD PRESSURE: 63 MMHG | OXYGEN SATURATION: 99 % | WEIGHT: 162.7 LBS | HEART RATE: 77 BPM | HEIGHT: 63 IN | RESPIRATION RATE: 18 BRPM | BODY MASS INDEX: 28.83 KG/M2

## 2025-02-27 DIAGNOSIS — I47.19 PAROXYSMAL ATRIAL TACHYCARDIA (CMS-HCC): ICD-10-CM

## 2025-02-27 DIAGNOSIS — Z45.09 ENCOUNTER FOR LOOP RECORDER AT END OF BATTERY LIFE: Primary | ICD-10-CM

## 2025-02-27 DIAGNOSIS — R55 SYNCOPE, VASOVAGAL: ICD-10-CM

## 2025-02-27 DIAGNOSIS — R00.2 PALPITATIONS: ICD-10-CM

## 2025-02-27 DIAGNOSIS — Z95.818 STATUS POST PLACEMENT OF IMPLANTABLE LOOP RECORDER: ICD-10-CM

## 2025-02-27 LAB
ANION GAP SERPL CALC-SCNC: 11 MMOL/L (ref 10–20)
APTT PPP: 30 SECONDS (ref 26–36)
ATRIAL RATE: 71 BPM
BUN SERPL-MCNC: 12 MG/DL (ref 6–23)
CALCIUM SERPL-MCNC: 8.8 MG/DL (ref 8.6–10.3)
CHLORIDE SERPL-SCNC: 104 MMOL/L (ref 98–107)
CO2 SERPL-SCNC: 25 MMOL/L (ref 21–32)
CREAT SERPL-MCNC: 0.81 MG/DL (ref 0.5–1.05)
EGFRCR SERPLBLD CKD-EPI 2021: 75 ML/MIN/1.73M*2
ERYTHROCYTE [DISTWIDTH] IN BLOOD BY AUTOMATED COUNT: 12.4 % (ref 11.5–14.5)
GLUCOSE SERPL-MCNC: 92 MG/DL (ref 74–99)
HCT VFR BLD AUTO: 36.9 % (ref 36–46)
HGB BLD-MCNC: 12.3 G/DL (ref 12–16)
INR PPP: 1 (ref 0.9–1.1)
MCH RBC QN AUTO: 31.9 PG (ref 26–34)
MCHC RBC AUTO-ENTMCNC: 33.3 G/DL (ref 32–36)
MCV RBC AUTO: 96 FL (ref 80–100)
NRBC BLD-RTO: 0 /100 WBCS (ref 0–0)
P AXIS: 35 DEGREES
P OFFSET: 196 MS
P ONSET: 145 MS
PLATELET # BLD AUTO: 335 X10*3/UL (ref 150–450)
POTASSIUM SERPL-SCNC: 4.2 MMOL/L (ref 3.5–5.3)
PR INTERVAL: 142 MS
PROTHROMBIN TIME: 11.4 SECONDS (ref 9.8–12.4)
Q ONSET: 216 MS
QRS COUNT: 11 BEATS
QRS DURATION: 70 MS
QT INTERVAL: 392 MS
QTC CALCULATION(BAZETT): 425 MS
QTC FREDERICIA: 414 MS
R AXIS: -44 DEGREES
RBC # BLD AUTO: 3.85 X10*6/UL (ref 4–5.2)
SODIUM SERPL-SCNC: 136 MMOL/L (ref 136–145)
T AXIS: -19 DEGREES
T OFFSET: 412 MS
VENTRICULAR RATE: 71 BPM
WBC # BLD AUTO: 5.9 X10*3/UL (ref 4.4–11.3)

## 2025-02-27 PROCEDURE — 99153 MOD SED SAME PHYS/QHP EA: CPT | Performed by: INTERNAL MEDICINE

## 2025-02-27 PROCEDURE — 7100000009 HC PHASE TWO TIME - INITIAL BASE CHARGE: Performed by: INTERNAL MEDICINE

## 2025-02-27 PROCEDURE — 7100000001 HC RECOVERY ROOM TIME - INITIAL BASE CHARGE: Performed by: INTERNAL MEDICINE

## 2025-02-27 PROCEDURE — 33286 RMVL SUBQ CAR RHYTHM MNTR: CPT | Performed by: INTERNAL MEDICINE

## 2025-02-27 PROCEDURE — 36415 COLL VENOUS BLD VENIPUNCTURE: CPT | Performed by: NURSE PRACTITIONER

## 2025-02-27 PROCEDURE — 33285 INSJ SUBQ CAR RHYTHM MNTR: CPT | Performed by: INTERNAL MEDICINE

## 2025-02-27 PROCEDURE — C1764 EVENT RECORDER, CARDIAC: HCPCS | Performed by: INTERNAL MEDICINE

## 2025-02-27 PROCEDURE — 85610 PROTHROMBIN TIME: CPT | Performed by: NURSE PRACTITIONER

## 2025-02-27 PROCEDURE — 2780000003 HC OR 278 NO HCPCS: Performed by: INTERNAL MEDICINE

## 2025-02-27 PROCEDURE — 80048 BASIC METABOLIC PNL TOTAL CA: CPT | Performed by: NURSE PRACTITIONER

## 2025-02-27 PROCEDURE — 2500000005 HC RX 250 GENERAL PHARMACY W/O HCPCS: Performed by: NURSE PRACTITIONER

## 2025-02-27 PROCEDURE — 93291 INTERROG DEV EVAL SCRMS IP: CPT | Mod: 59 | Performed by: INTERNAL MEDICINE

## 2025-02-27 PROCEDURE — 85027 COMPLETE CBC AUTOMATED: CPT | Performed by: NURSE PRACTITIONER

## 2025-02-27 PROCEDURE — 2500000004 HC RX 250 GENERAL PHARMACY W/ HCPCS (ALT 636 FOR OP/ED): Performed by: INTERNAL MEDICINE

## 2025-02-27 PROCEDURE — 7100000010 HC PHASE TWO TIME - EACH INCREMENTAL 1 MINUTE: Performed by: INTERNAL MEDICINE

## 2025-02-27 PROCEDURE — 99152 MOD SED SAME PHYS/QHP 5/>YRS: CPT | Performed by: INTERNAL MEDICINE

## 2025-02-27 PROCEDURE — 7100000002 HC RECOVERY ROOM TIME - EACH INCREMENTAL 1 MINUTE: Performed by: INTERNAL MEDICINE

## 2025-02-27 PROCEDURE — 93285 PRGRMG DEV EVAL SCRMS IP: CPT | Performed by: INTERNAL MEDICINE

## 2025-02-27 PROCEDURE — 93010 ELECTROCARDIOGRAM REPORT: CPT | Performed by: INTERNAL MEDICINE

## 2025-02-27 PROCEDURE — 2720000007 HC OR 272 NO HCPCS: Performed by: INTERNAL MEDICINE

## 2025-02-27 PROCEDURE — 93005 ELECTROCARDIOGRAM TRACING: CPT

## 2025-02-27 PROCEDURE — 2500000004 HC RX 250 GENERAL PHARMACY W/ HCPCS (ALT 636 FOR OP/ED): Performed by: NURSE PRACTITIONER

## 2025-02-27 DEVICE — ICM LNQ22 LINQ II USA
Type: IMPLANTABLE DEVICE | Site: CHEST | Status: FUNCTIONAL
Brand: LINQ II™

## 2025-02-27 RX ORDER — FENTANYL CITRATE 50 UG/ML
INJECTION, SOLUTION INTRAMUSCULAR; INTRAVENOUS AS NEEDED
Status: DISCONTINUED | OUTPATIENT
Start: 2025-02-27 | End: 2025-02-27 | Stop reason: HOSPADM

## 2025-02-27 RX ORDER — MIDAZOLAM HYDROCHLORIDE 1 MG/ML
INJECTION, SOLUTION INTRAMUSCULAR; INTRAVENOUS AS NEEDED
Status: DISCONTINUED | OUTPATIENT
Start: 2025-02-27 | End: 2025-02-27 | Stop reason: HOSPADM

## 2025-02-27 RX ORDER — ONDANSETRON HYDROCHLORIDE 2 MG/ML
INJECTION, SOLUTION INTRAVENOUS AS NEEDED
Status: DISCONTINUED | OUTPATIENT
Start: 2025-02-27 | End: 2025-02-27 | Stop reason: HOSPADM

## 2025-02-27 RX ORDER — BUPIVACAINE HYDROCHLORIDE 2.5 MG/ML
INJECTION, SOLUTION EPIDURAL; INFILTRATION; INTRACAUDAL AS NEEDED
Status: DISCONTINUED | OUTPATIENT
Start: 2025-02-27 | End: 2025-02-27 | Stop reason: HOSPADM

## 2025-02-27 RX ORDER — VANCOMYCIN HYDROCHLORIDE 1 G/200ML
1000 INJECTION, SOLUTION INTRAVENOUS ONCE
Status: COMPLETED | OUTPATIENT
Start: 2025-02-27 | End: 2025-02-27

## 2025-02-27 RX ORDER — LIDOCAINE HYDROCHLORIDE 10 MG/ML
INJECTION, SOLUTION EPIDURAL; INFILTRATION; INTRACAUDAL; PERINEURAL AS NEEDED
Status: DISCONTINUED | OUTPATIENT
Start: 2025-02-27 | End: 2025-02-27 | Stop reason: HOSPADM

## 2025-02-27 RX ORDER — DIPHENHYDRAMINE HYDROCHLORIDE 50 MG/ML
25 INJECTION INTRAMUSCULAR; INTRAVENOUS ONCE
Status: COMPLETED | OUTPATIENT
Start: 2025-02-27 | End: 2025-02-27

## 2025-02-27 RX ORDER — MUPIROCIN 20 MG/G
1 OINTMENT TOPICAL ONCE
Status: COMPLETED | OUTPATIENT
Start: 2025-02-27 | End: 2025-02-27

## 2025-02-27 RX ORDER — CHLORHEXIDINE GLUCONATE 40 MG/ML
SOLUTION TOPICAL ONCE
Status: DISCONTINUED | OUTPATIENT
Start: 2025-02-27 | End: 2025-02-27 | Stop reason: HOSPADM

## 2025-02-27 RX ORDER — APIXABAN 5 MG/1
5 TABLET, FILM COATED ORAL 2 TIMES DAILY
Start: 2025-02-27

## 2025-02-27 RX ADMIN — MUPIROCIN 1 APPLICATION: 20 OINTMENT TOPICAL at 11:34

## 2025-02-27 RX ADMIN — VANCOMYCIN HYDROCHLORIDE 1000 MG: 1 INJECTION, SOLUTION INTRAVENOUS at 11:35

## 2025-02-27 RX ADMIN — DIPHENHYDRAMINE HYDROCHLORIDE 25 MG: 50 INJECTION, SOLUTION INTRAMUSCULAR; INTRAVENOUS at 13:31

## 2025-02-27 ASSESSMENT — PAIN - FUNCTIONAL ASSESSMENT
PAIN_FUNCTIONAL_ASSESSMENT: 0-10

## 2025-02-27 ASSESSMENT — PAIN SCALES - GENERAL
PAINLEVEL_OUTOF10: 0 - NO PAIN

## 2025-02-27 ASSESSMENT — COLUMBIA-SUICIDE SEVERITY RATING SCALE - C-SSRS
6. HAVE YOU EVER DONE ANYTHING, STARTED TO DO ANYTHING, OR PREPARED TO DO ANYTHING TO END YOUR LIFE?: NO
1. IN THE PAST MONTH, HAVE YOU WISHED YOU WERE DEAD OR WISHED YOU COULD GO TO SLEEP AND NOT WAKE UP?: NO
2. HAVE YOU ACTUALLY HAD ANY THOUGHTS OF KILLING YOURSELF?: NO

## 2025-02-27 NOTE — INTERVAL H&P NOTE
H&P reviewed. The patient was examined and there are no changes to the H&P.    In addition, she is anxious about procedure.    Lab Results   Component Value Date    WBC 5.9 02/27/2025    HGB 12.3 02/27/2025    HCT 36.9 02/27/2025    MCV 96 02/27/2025     02/27/2025      Lab Results   Component Value Date    GLUCOSE 92 02/27/2025    CALCIUM 8.8 02/27/2025     02/27/2025    K 4.2 02/27/2025    CO2 25 02/27/2025     02/27/2025    BUN 12 02/27/2025    CREATININE 0.81 02/27/2025      Lab Results   Component Value Date    INR 1.0 02/27/2025    INR 1.1 04/11/2024    PROTIME 11.4 02/27/2025    PROTIME 12.2 04/11/2024      Counseling greater than 50% of the visit performed.  The patient and I discussed syncope, loop recorder, shared decision making, [place] decision tool, preoperative evaluation, blood work, treatment options, risk, benefits, and imponderables.  All questions answered.  Patient appreciative care

## 2025-02-27 NOTE — SIGNIFICANT EVENT
Patient post loop implant. Left pectoral site remains stable. Ice pack to site. No complaint of pain. Daughter at bedside, Patient sitting up 45 degrees eating lunch. Patient care ongoing.

## 2025-02-27 NOTE — DISCHARGE INSTRUCTIONS
DISCHARGE INSTRUCTIONS FOR LOOP RECORDER    ACTIVITY  DO NOT drive a car for 24 hrs.  DO NOT drive until cleared by your provider if you have had a recent passing out spell or previously restricted from driving.  DO NOT operate power equipment/heavy machinery for 24 hrs.  DO NOT sign any legal documents for 24 hrs.  DO NOT drink alcohol for 24 hrs.    WOUND CARE  DO NOT remove the water resistant dressing.  Keep the dressing clean and dry.  May shower in 24 hrs.   Avoid letting the water directly hit the wound.  May apply ice to the wound for comfort/swelling intermittently 20 mins on and 20 mins off for 24-48 hours.  May take Tylenol or Motrin for further pain relief.    CALL YOUR PHYSICIAN IMMEDIATELY FOR:  Wound edges that are gaping.  Wound that is red, swollen, painful or has foul smelling drainage.  Excessive bright red bleeding or saturated dressing.  Increased pain not controlled by medication.  Chills/fever over 100 degrees F.    REMOTE MONITORING/DEVICE INFO  You have been instructed by the device company representative regarding remote home monitoring.   Please follow the instructions provided to you about your specific device.  You will be given a temporary device ID card.  You will receive a permanent device ID card in the mail in 6-8 weeks and should keep this with you at all times.  If you have questions, please contact the [facility] device clinic for further instruction [phone].      Follow up in the office in 1 week for a wound check/dressing removal as scheduled above.  Follow up in 4 months for a device check in the hospital and follow up with the Electrophysiology service as scheduled above.

## 2025-02-27 NOTE — NURSING NOTE
Patient states understanding of discharge instructions as given via teach back. Follow up appointments and medications reviewed with patient as well.

## 2025-03-03 LAB
ATRIAL RATE: 71 BPM
P AXIS: 35 DEGREES
P OFFSET: 196 MS
P ONSET: 145 MS
PR INTERVAL: 142 MS
Q ONSET: 216 MS
QRS COUNT: 11 BEATS
QRS DURATION: 70 MS
QT INTERVAL: 392 MS
QTC CALCULATION(BAZETT): 425 MS
QTC FREDERICIA: 414 MS
R AXIS: -44 DEGREES
T AXIS: -19 DEGREES
T OFFSET: 412 MS
VENTRICULAR RATE: 71 BPM

## 2025-03-06 ENCOUNTER — APPOINTMENT (OUTPATIENT)
Dept: CARDIOLOGY | Facility: CLINIC | Age: 80
End: 2025-03-06
Payer: MEDICARE

## 2025-03-06 VITALS
DIASTOLIC BLOOD PRESSURE: 84 MMHG | SYSTOLIC BLOOD PRESSURE: 128 MMHG | BODY MASS INDEX: 29.38 KG/M2 | HEART RATE: 80 BPM | WEIGHT: 165.8 LBS | HEIGHT: 63 IN

## 2025-03-06 DIAGNOSIS — R55 SYNCOPE, VASOVAGAL: ICD-10-CM

## 2025-03-06 DIAGNOSIS — I47.19 PAROXYSMAL ATRIAL TACHYCARDIA (CMS-HCC): ICD-10-CM

## 2025-03-06 PROCEDURE — 99024 POSTOP FOLLOW-UP VISIT: CPT | Performed by: INTERNAL MEDICINE

## 2025-03-06 NOTE — PROGRESS NOTES
"Patient is here for a Wound check ordered by Dr. Sutherland status post loop recorder insertion. Patient had device inserted on 2/27/25 at [facility] for diagnosis of syncope and paroxysmal atrial tachycardia. Medication list was Updated verbally with patient in office. Denies any cardiac complaints at this time.    To Dr. Sutherland to review.         Vitals:    03/06/25 1334   BP: 128/84   BP Location: Left arm   Patient Position: Sitting   Pulse: 80   Weight: 75.2 kg (165 lb 12.8 oz)   Height: 1.6 m (5' 3\")       "

## 2025-04-04 ENCOUNTER — HOSPITAL ENCOUNTER (OUTPATIENT)
Dept: CARDIOLOGY | Facility: HOSPITAL | Age: 80
Discharge: HOME | End: 2025-04-04
Payer: MEDICARE

## 2025-04-04 DIAGNOSIS — R55 SYNCOPE AND COLLAPSE: ICD-10-CM

## 2025-04-04 DIAGNOSIS — Z95.818 PRESENCE OF OTHER CARDIAC IMPLANTS AND GRAFTS: ICD-10-CM

## 2025-04-04 PROCEDURE — 93298 REM INTERROG DEV EVAL SCRMS: CPT

## 2025-04-11 ENCOUNTER — HOSPITAL ENCOUNTER (OUTPATIENT)
Dept: CARDIOLOGY | Facility: HOSPITAL | Age: 80
Discharge: HOME | End: 2025-04-11
Payer: MEDICARE

## 2025-04-11 DIAGNOSIS — R55 SYNCOPE AND COLLAPSE: ICD-10-CM

## 2025-04-11 DIAGNOSIS — Z95.818 PRESENCE OF OTHER CARDIAC IMPLANTS AND GRAFTS: ICD-10-CM

## 2025-05-09 ENCOUNTER — HOSPITAL ENCOUNTER (OUTPATIENT)
Dept: CARDIOLOGY | Facility: HOSPITAL | Age: 80
Discharge: HOME | End: 2025-05-09
Payer: MEDICARE

## 2025-05-09 DIAGNOSIS — Z95.818 PRESENCE OF OTHER CARDIAC IMPLANTS AND GRAFTS: ICD-10-CM

## 2025-05-09 DIAGNOSIS — R55 SYNCOPE AND COLLAPSE: ICD-10-CM

## 2025-05-09 PROCEDURE — 93298 REM INTERROG DEV EVAL SCRMS: CPT

## 2025-06-02 ENCOUNTER — TELEPHONE (OUTPATIENT)
Dept: CARDIOLOGY | Facility: CLINIC | Age: 80
End: 2025-06-02

## 2025-06-02 ENCOUNTER — APPOINTMENT (OUTPATIENT)
Dept: CARDIOLOGY | Facility: CLINIC | Age: 80
End: 2025-06-02
Payer: MEDICARE

## 2025-06-02 VITALS — SYSTOLIC BLOOD PRESSURE: 118 MMHG | HEART RATE: 70 BPM | DIASTOLIC BLOOD PRESSURE: 64 MMHG

## 2025-06-02 DIAGNOSIS — Z79.01 LONG TERM (CURRENT) USE OF ANTICOAGULANTS: ICD-10-CM

## 2025-06-02 DIAGNOSIS — R06.09 DOE (DYSPNEA ON EXERTION): ICD-10-CM

## 2025-06-02 DIAGNOSIS — R94.39 ABNORMAL CARDIOVASCULAR STRESS TEST: Primary | ICD-10-CM

## 2025-06-02 DIAGNOSIS — I48.0 PAROXYSMAL ATRIAL FIBRILLATION (MULTI): ICD-10-CM

## 2025-06-02 DIAGNOSIS — Z95.818 STATUS POST PLACEMENT OF IMPLANTABLE LOOP RECORDER: ICD-10-CM

## 2025-06-02 DIAGNOSIS — I10 BENIGN ESSENTIAL HYPERTENSION: ICD-10-CM

## 2025-06-02 PROBLEM — I65.29 CAROTID ARTERY STENOSIS: Status: RESOLVED | Noted: 2024-01-03 | Resolved: 2025-06-02

## 2025-06-02 PROBLEM — Z45.09 ENCOUNTER FOR LOOP RECORDER AT END OF BATTERY LIFE: Status: RESOLVED | Noted: 2024-03-19 | Resolved: 2025-06-02

## 2025-06-02 PROCEDURE — 3074F SYST BP LT 130 MM HG: CPT | Performed by: NURSE PRACTITIONER

## 2025-06-02 PROCEDURE — 3078F DIAST BP <80 MM HG: CPT | Performed by: NURSE PRACTITIONER

## 2025-06-02 PROCEDURE — 1159F MED LIST DOCD IN RCRD: CPT | Performed by: NURSE PRACTITIONER

## 2025-06-02 PROCEDURE — 1036F TOBACCO NON-USER: CPT | Performed by: NURSE PRACTITIONER

## 2025-06-02 PROCEDURE — 1160F RVW MEDS BY RX/DR IN RCRD: CPT | Performed by: NURSE PRACTITIONER

## 2025-06-02 PROCEDURE — 99214 OFFICE O/P EST MOD 30 MIN: CPT | Performed by: NURSE PRACTITIONER

## 2025-06-02 RX ORDER — DENOSUMAB 60 MG/ML
60 INJECTION SUBCUTANEOUS
COMMUNITY

## 2025-06-02 ASSESSMENT — ENCOUNTER SYMPTOMS
SYNCOPE: 0
NEAR-SYNCOPE: 0
ORTHOPNEA: 0
IRREGULAR HEARTBEAT: 0
PALPITATIONS: 0
SHORTNESS OF BREATH: 1
DYSPNEA ON EXERTION: 1
PND: 0

## 2025-06-02 NOTE — ASSESSMENT & PLAN NOTE
February 27, 2025 implant of new loop recorder.  Patient is requesting transfer of care to [place] as it is getting harder for her to make the trips to [place].  April 2025 device interrogation reviewed.  This was originally implanted due to history of syncope.

## 2025-06-02 NOTE — TELEPHONE ENCOUNTER
Heart Cath 18294 DX: R94.39, R06.09 at [facility] does not require prior auth per Franca MONTANA at [facility], call reference is Franca MONTANA-6/2/25.

## 2025-06-02 NOTE — PROGRESS NOTES
"Chief Complaint  \"I wanted to come back to Dr. Stanley so I don't have to drive to [place].\"  \"I failed my stress test\"    Reason for Visit  Patient presents to the office today for outpatient follow-up for abnormal cardiovascular testing.  Last evaluated in clinic by Dr. Sutherland in February 2025.    Presents today in wheelchair due to a fall last night and knee injury.  Accompanied by daughter.  Patient denies any hospitalizations or significant changes to interval medical history since last office follow-up.     History of Present Illness   Patient is a very pleasant 79-year-old who presents to the office today primarily to reestablish with local cardiac care, requests loop recording interrogations to be completed at and I will make arrangements.  She reports a clear accidental fall last night and spent the night in the emergency department where she fractured her left hand, some type of injury to her right knee.  She will be seeing orthopedics this afternoon.    She reports that for the last couple months she has had persistent\" belching\".  She reports sometimes this is exertional in nature, sometimes it is not.  It can be associated with some chest pressure.  Due to this, PCP ordered a perfusion study that was mildly abnormal with mid anterior wall mild defect versus artifact.  She goes on to report progressive worsening dyspnea on exertion and\" just not feeling like I used to\".  The daughter has noted that with activity like going shopping, she also is having a difficult time working out in her garden.  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    She is concerned that she would not be referred to GI workup until\" things with my heart are straightened out\" and she also may need an orthopedic procedure.    Discussed the possibility of breast attenuation artifact and a false positive perfusion study.  Both she and the daughter are extremely concerned regarding this dyspnea on exertion and change in " exercise capacity and functional tolerance.  Discussed the low likelihood of progression of coronary disease over 5 years although it is not impossible.  Due to age of 79 cardiac CTA really is not optimal treatment modality.  After lengthy discussion regarding informed decision both patient and daughter preferred to proceed with cardiac catheterization with Dr. Stanley to more clearly dealing made anatomy, clear up etiology of symptoms and to help with additional noncardiovascular procedure in the future.    Due to progressive symptoms the risks, benefits and procedure of cardiac catheterization plus or minus coronary intervention were discussed including but not limited to 1:1000 myocardial infarction, stroke,  urgent coronary artery bypass graft and rare recorded death:  1:100 vascular complications and contrast-induced acute renal failure.  The patient is in agreement to proceed.    Patient  presents with no IVP allergies.  Patient  has no contraindication to antiplatelet therapy, no planned surgical procedures in the near future.   Concomitant medications have been reviewed. Last renal function has been reviewed.    Patient reports that overall has no complaint(s) of claudication, exertional chest pressure/discomfort, irregular heart beat, lower extremity edema, near-syncope, and orthopnea    Review of Systems   Constitutional: Positive for malaise/fatigue.   Cardiovascular:  Positive for chest pain and dyspnea on exertion. Negative for irregular heartbeat, leg swelling, near-syncope, orthopnea, palpitations, paroxysmal nocturnal dyspnea and syncope.   Respiratory:  Positive for shortness of breath.         Visit Vitals  /64 (BP Location: Left arm, Patient Position: Sitting)   Pulse 70   Smoking Status Former     Physical Exam  Vitals and nursing note reviewed.   HENT:      Head: Normocephalic.   Cardiovascular:      Rate and Rhythm: Normal rate and regular rhythm.      Heart sounds: Normal heart sounds.  "  Pulmonary:      Effort: Pulmonary effort is normal.      Breath sounds: Normal breath sounds.   Abdominal:      Palpations: Abdomen is soft.   Musculoskeletal:      Right lower leg: No edema.      Left lower leg: No edema.   Skin:     General: Skin is warm and dry.   Neurological:      General: No focal deficit present.      Mental Status: She is alert.   Psychiatric:         Mood and Affect: Mood normal.         Behavior: Behavior normal.          ALLERGIES:  Acetaminophen, Bisacodyl, Ciprofloxacin, Codeine, Doxycycline, Formoterol, Oxybutynin, Penicillin g sodium, Propoxyphene n-acetaminophen, and Nitrofurantoin monohyd/m-cryst    Current Outpatient Medications   Medication Instructions    ascorbic acid (Vitamin C) 500 mg tablet 1 tablet, Daily    cholecalciferol (Vitamin D-3) 25 MCG (1000 UT) tablet 1 tablet, Daily    cyanocobalamin (VITAMIN B-12) 500 mcg, Daily    Eliquis 5 mg, oral, 2 times daily, HOLD.  Resume 3/2/2025 post loop recorder placement.    fluticasone (Flonase) 50 mcg/actuation nasal spray 1 spray, Daily    magnesium oxide (MAG-OX) 200 mg, Daily    melatonin 3 mg, Nightly PRN    metoprolol succinate XL (TOPROL-XL) 25 mg, oral, Daily    metoprolol tartrate (LOPRESSOR) 25 mg, oral, As needed    nitroglycerin (NITROSTAT) 0.4 mg, sublingual, Every 5 min PRN, May repeat dose every 5 minutes for up to 3 doses total.    Prolia 60 mg, Every 6 months      Assessment:    Benign essential hypertension  optimal in office      MCDANIELS (dyspnea on exertion)  Over the last month reports progressive worsening dyspnea on exertion and feeling like\" I cannot do what I used to do\".  The daughter verifies that at a recent trip to the [employer] she was much more short of breath coming in from the parking lot, was too fatigued to complete the shopping.  She also is having difficulty pulling her wagon through the yard to do the gardening.    Status post placement of implantable loop recorder  February 27, 2025 implant of " "new loop recorder.  Patient is requesting transfer of care to [place] as it is getting harder for her to make the trips to [place].  April 2025 device interrogation reviewed.  This was originally implanted due to history of syncope.    Abnormal cardiovascular stress test  May 2025 MPI  Mildly abnormal mid anterior wall defect -cannot rule out artifact.  Preserved LVEF, TID ratio 1.16.    Ordered by PCP due to history of belching that is sometimes exertional in nature and results in chest pressure.    November 2020 cardiac cath with angiographically normal coronaries, normal LVEF.    Discussed the possibility of perfusion study being a false positive and likely due to underlying artifact.  She does have concerning symptoms of dyspnea on exertion progressive worsening fatigability plus or minus exertional belching.  She reports unable to undergo GI workup until\" my heart is figured out\" and she also had a recent fall with fracture to her left wrist and injury to her knee-likely will need orthopedic procedure in the near future.    Due to 79 years old cardiac CTA not optimal modality.    Paroxysmal atrial fibrillation (Multi)  Low A-fib burden on loop recorder interrogations.  Denies prior antiarrhythmic.    Long term (current) use of anticoagulants  CHADS VASc 4 anticoagulated full dose Eliquis age 79, weight 75 kg.  Denies bleeding diatheses    BMI 29.0-29.9,adult  Reviewed the merits of healthy lifestyle choices on overall cardiovascular health.      Plan:     Through informed decision making process incorporating patients unique circumstances, the following treatment plan will be initiated:    1.  Prescription drug management of cardiovascular medication for efficacy, adherence to treatment, side effect assessment and polypharmacy. Current treatment clinically warranted and to continue without modifications.    2.  Cardiac cath with Dr. Stanley (abnormal stress test, SOB, fatigue, need for GI and possibly ortho " procedure)  -  Hold Eliquis 2 days prior to procedure  -  Begin ASA 81mg daily 5 days before procedure    3.  Enroll in [facility] device clinic for ILR follow up    4. Return for follow-up; in the interim, contact the office if new symptoms arise.  Dr. Stanley 4 months    Shefali Bailey MSN, APRN-CNP, PMHNP-[facility]  [place], [place]     Please excuse any errors in grammar or translation related to this dictation. Voice recognition software was utilized to prepare this document.Patient was identified as a fall risk. Risk prevention instructions provided.

## 2025-06-02 NOTE — ASSESSMENT & PLAN NOTE
"Over the last month reports progressive worsening dyspnea on exertion and feeling like\" I cannot do what I used to do\".  The daughter verifies that at a recent trip to the [employer] mall she was much more short of breath coming in from the parking lot, was too fatigued to complete the shopping.  She also is having difficulty pulling her wagon through the yard to do the gardening.  "

## 2025-06-02 NOTE — PATIENT INSTRUCTIONS
Please bring all medicines, vitamins, and herbal supplements with you when you come to the office.    Prescriptions will not be filled unless you are compliant with your follow up appointments or have a follow up appointment scheduled as per instruction of your physician. Refills should be requested at the time of your visit.     PLAN:   Through informed decision making process incorporating patients unique circumstances, the following treatment plan will be initiated:    1.  Prescription drug management of cardiovascular medication for efficacy, adherence to treatment, side effect assessment and polypharmacy. Current treatment clinically warranted and to continue without modifications.    2.  Cardiac cath with Dr. Stanley (abnormal stress test, SOB, fatigue, need for GI and possibly ortho procedure)  -  Hold Eliquis 2 days prior to procedure  -  Begin ASA 81mg daily 5 days before procedure    3.  Enroll in [facility] device clinic for ILR follow up    4. Return for follow-up; in the interim, contact the office if new symptoms arise.  Dr. Stanley 4 months        Ways to Help Prevent Falls at Home    Quick Tips   ? Ask for help if you need it. Most people want to help!   ? Get up slowly after sitting or laying down   ? Wear a medical alert device or keep cell phone in your pocket   ? Use night lights, especially areas near a bathroom   ? Keep the items you use often within reach on a small stool or end table   ? Use an assistive device such as walker or cane, as directed by provider/physical therapy   ? Use a non-slip mat and grab bars in your bathroom. Look for home health sections for best options     Other Areas to Focus On   ? Exercise and nutrition: Regular exercise or taking a falls prevention class are great ways improve strength and balance. Don’t forget to stay hydrated and bring a snack!   ? Medicine side effects: Some medicines can make you sleepy or dizzy, which could cause a fall. Ask your healthcare provider  about the side effects your medicines could cause. Be sure to let them know if you take any vitamins or supplements as well.   ? Tripping hazards: Remove items you could trip on, such as loose mats, rugs, cords, and clutter. Wear closed toe shoes with rubber soles.   ? Health and wellness: Get regular checkups with your healthcare provider, plus routine vision and hearing screenings. Talk with your healthcare provider about:   o Your medicines and the possible side effects - bring them in a bag if that is easier!   o Problems with balance or feeling dizzy   o Ways to promote bone health, such as Vitamin D and calcium supplements   o Questions or concerns about falling     *Ask your healthcare team if you have questions     ©[facility], 2022

## 2025-06-02 NOTE — ASSESSMENT & PLAN NOTE
"May 2025 MPI  Mildly abnormal mid anterior wall defect -cannot rule out artifact.  Preserved LVEF, TID ratio 1.16.    Ordered by PCP due to history of belching that is sometimes exertional in nature and results in chest pressure.    November 2020 cardiac cath with angiographically normal coronaries, normal LVEF.    Discussed the possibility of perfusion study being a false positive and likely due to underlying artifact.  She does have concerning symptoms of dyspnea on exertion progressive worsening fatigability plus or minus exertional belching.  She reports unable to undergo GI workup until\" my heart is figured out\" and she also had a recent fall with fracture to her left wrist and injury to her knee-likely will need orthopedic procedure in the near future.    Due to 79 years old cardiac CTA not optimal modality.  "

## 2025-06-02 NOTE — LETTER
"June 2, 2025     Dakota Huffman MD  [address]  [facility] [address]    Patient: Lilliana Bryan   YOB: 1945   Date of Visit: 6/2/2025       Dear Dr. Dakota Huffman MD:    Thank you for referring Lilliana Bryan to me for evaluation. Below are my notes for this consultation.  If you have questions, please do not hesitate to call me. I look forward to following your patient along with you.       Sincerely,     Shefali Bailey, APRN-CNP      CC: No Recipients  ______________________________________________________________________________________    Chief Complaint  \"I wanted to come back to Dr. Stanley so I don't have to drive to [place].\"  \"I failed my stress test\"    Reason for Visit  Patient presents to the office today for outpatient follow-up for abnormal cardiovascular testing.  Last evaluated in clinic by Dr. Sutherland in February 2025.    Presents today in wheelchair due to a fall last night and knee injury.  Accompanied by daughter.  Patient denies any hospitalizations or significant changes to interval medical history since last office follow-up.     History of Present Illness   Patient is a very pleasant 79-year-old who presents to the office today primarily to reestablish with local cardiac care, requests loop recording interrogations to be completed at and I will make arrangements.  She reports a clear accidental fall last night and spent the night in the emergency department where she fractured her left hand, some type of injury to her right knee.  She will be seeing orthopedics this afternoon.    She reports that for the last couple months she has had persistent\" belching\".  She reports sometimes this is exertional in nature, sometimes it is not.  It can be associated with some chest pressure.  Due to this, PCP ordered a perfusion study that was mildly abnormal with mid anterior wall mild defect versus artifact.  She goes on to report progressive worsening dyspnea on " "exertion and\" just not feeling like I used to\".  The daughter has noted that with activity like going shopping, she also is having a difficult time working out in her garden.  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    She is concerned that she would not be referred to GI workup until\" things with my heart are straightened out\" and she also may need an orthopedic procedure.    Discussed the possibility of breast attenuation artifact and a false positive perfusion study.  Both she and the daughter are extremely concerned regarding this dyspnea on exertion and change in exercise capacity and functional tolerance.  Discussed the low likelihood of progression of coronary disease over 5 years although it is not impossible.  Due to age of 79 cardiac CTA really is not optimal treatment modality.  After lengthy discussion regarding informed decision both patient and daughter preferred to proceed with cardiac catheterization with Dr. Stanley to more clearly dealing made anatomy, clear up etiology of symptoms and to help with additional noncardiovascular procedure in the future.    Due to progressive symptoms the risks, benefits and procedure of cardiac catheterization plus or minus coronary intervention were discussed including but not limited to 1:1000 myocardial infarction, stroke,  urgent coronary artery bypass graft and rare recorded death:  1:100 vascular complications and contrast-induced acute renal failure.  The patient is in agreement to proceed.    Patient  presents with no IVP allergies.  Patient  has no contraindication to antiplatelet therapy, no planned surgical procedures in the near future.   Concomitant medications have been reviewed. Last renal function has been reviewed.    Patient reports that overall has no complaint(s) of claudication, exertional chest pressure/discomfort, irregular heart beat, lower extremity edema, near-syncope, and orthopnea    Review of Systems   Constitutional: " Positive for malaise/fatigue.   Cardiovascular:  Positive for chest pain and dyspnea on exertion. Negative for irregular heartbeat, leg swelling, near-syncope, orthopnea, palpitations, paroxysmal nocturnal dyspnea and syncope.   Respiratory:  Positive for shortness of breath.         Visit Vitals  /64 (BP Location: Left arm, Patient Position: Sitting)   Pulse 70   Smoking Status Former     Physical Exam  Vitals and nursing note reviewed.   HENT:      Head: Normocephalic.   Cardiovascular:      Rate and Rhythm: Normal rate and regular rhythm.      Heart sounds: Normal heart sounds.   Pulmonary:      Effort: Pulmonary effort is normal.      Breath sounds: Normal breath sounds.   Abdominal:      Palpations: Abdomen is soft.   Musculoskeletal:      Right lower leg: No edema.      Left lower leg: No edema.   Skin:     General: Skin is warm and dry.   Neurological:      General: No focal deficit present.      Mental Status: She is alert.   Psychiatric:         Mood and Affect: Mood normal.         Behavior: Behavior normal.          ALLERGIES:  Acetaminophen, Bisacodyl, Ciprofloxacin, Codeine, Doxycycline, Formoterol, Oxybutynin, Penicillin g sodium, Propoxyphene n-acetaminophen, and Nitrofurantoin monohyd/m-cryst    Current Outpatient Medications   Medication Instructions   • ascorbic acid (Vitamin C) 500 mg tablet 1 tablet, Daily   • cholecalciferol (Vitamin D-3) 25 MCG (1000 UT) tablet 1 tablet, Daily   • cyanocobalamin (VITAMIN B-12) 500 mcg, Daily   • Eliquis 5 mg, oral, 2 times daily, HOLD.  Resume 3/2/2025 post loop recorder placement.   • fluticasone (Flonase) 50 mcg/actuation nasal spray 1 spray, Daily   • magnesium oxide (MAG-OX) 200 mg, Daily   • melatonin 3 mg, Nightly PRN   • metoprolol succinate XL (TOPROL-XL) 25 mg, oral, Daily   • metoprolol tartrate (LOPRESSOR) 25 mg, oral, As needed   • nitroglycerin (NITROSTAT) 0.4 mg, sublingual, Every 5 min PRN, May repeat dose every 5 minutes for up to 3 doses  "total.   • Prolia 60 mg, Every 6 months      Assessment:    Benign essential hypertension  optimal in office      MCDANIELS (dyspnea on exertion)  Over the last month reports progressive worsening dyspnea on exertion and feeling like\" I cannot do what I used to do\".  The daughter verifies that at a recent trip to the outlet mall she was much more short of breath coming in from the parking lot, was too fatigued to complete the shopping.  She also is having difficulty pulling her wagon through the yard to do the gardening.    Status post placement of implantable loop recorder  February 27, 2025 implant of new loop recorder.  Patient is requesting transfer of care to [place] as it is getting harder for her to make the trips to [place].  April 2025 device interrogation reviewed.  This was originally implanted due to history of syncope.    Abnormal cardiovascular stress test  May 2025 MPI  Mildly abnormal mid anterior wall defect -cannot rule out artifact.  Preserved LVEF, TID ratio 1.16.    Ordered by PCP due to history of belching that is sometimes exertional in nature and results in chest pressure.    November 2020 cardiac cath with angiographically normal coronaries, normal LVEF.    Discussed the possibility of perfusion study being a false positive and likely due to underlying artifact.  She does have concerning symptoms of dyspnea on exertion progressive worsening fatigability plus or minus exertional belching.  She reports unable to undergo GI workup until\" my heart is figured out\" and she also had a recent fall with fracture to her left wrist and injury to her knee-likely will need orthopedic procedure in the near future.    Due to 79 years old cardiac CTA not optimal modality.    Paroxysmal atrial fibrillation (Multi)  Low A-fib burden on loop recorder interrogations.  Denies prior antiarrhythmic.    Long term (current) use of anticoagulants  CHADS VASc 4 anticoagulated full dose Eliquis age 79, weight 75 kg.  Denies " bleeding diatheses    BMI 29.0-29.9,adult  Reviewed the merits of healthy lifestyle choices on overall cardiovascular health.      Plan:     Through informed decision making process incorporating patients unique circumstances, the following treatment plan will be initiated:    1.  Prescription drug management of cardiovascular medication for efficacy, adherence to treatment, side effect assessment and polypharmacy. Current treatment clinically warranted and to continue without modifications.    2.  Cardiac cath with Dr. Stanley (abnormal stress test, SOB, fatigue, need for GI and possibly ortho procedure)  -  Hold Eliquis 2 days prior to procedure  -  Begin ASA 81mg daily 5 days before procedure    3.  En[facility] in [facility] for [facility]R follow up    4. Return for follow-up; in the interim, contact the office if new symptoms arise.  Dr. Stanley 4 months    Shefali Bailey MSN, APRN-CNP, PMHNP-[facility] [place]  [place], [place]     Please excuse any errors in grammar or translation related to this dictation. Voice recognition software was utilized to prepare this document.Patient was identified as a fall risk. Risk prevention instructions provided.

## 2025-06-05 LAB
NON-UH HIE ANION GAP: 10.8 MEQ/L (ref 6–15)
NON-UH HIE BASOPHILS # (AUTO): 0.1 10*3/UL (ref 0–0.2)
NON-UH HIE BASOPHILS % (AUTO): 1.5 %
NON-UH HIE BLOOD UREA NITROGEN: 10 MG/DL (ref 7–25)
NON-UH HIE CARBON DIOXIDE: 26.1 MMOL/L (ref 21–31)
NON-UH HIE CHLORIDE: 103 MMOL/L (ref 98–107)
NON-UH HIE CHOL/HDL RATIO: 2.8
NON-UH HIE CHOLESTEROL: 172 MG/DL (ref 140–200)
NON-UH HIE CREATININE: 0.71 MG/DL (ref 0.6–1.2)
NON-UH HIE EOSINOPHILS # (AUTO): 0.1 10*3/UL (ref 0–0.45)
NON-UH HIE EOSINOPHILS % (AUTO): 1.1 %
NON-UH HIE ESTIMATED GFR: >60 ML/MIN
NON-UH HIE HDL CHOLESTEROL: 61 MG/DL (ref 23–92)
NON-UH HIE HEMATOCRIT: 37.6 % (ref 34–46.4)
NON-UH HIE HEMOGLOBIN: 12.8 G/DL (ref 11.8–15.4)
NON-UH HIE INR: 1.6
NON-UH HIE LDL CHOLESTEROL,CALCULATED: 89 MG/DL (ref 0–100)
NON-UH HIE LYMPHOCYTES # (AUTO): 1.3 10*3/UL (ref 1–4.8)
NON-UH HIE LYMPHOCYTES % (AUTO): 21.5 %
NON-UH HIE MEAN CORPUSCULAR HEMOGLOBIN: 32.3 PG (ref 24.7–34.3)
NON-UH HIE MEAN CORPUSCULAR HGB CONC: 34 G/DL (ref 32–35)
NON-UH HIE MEAN CORPUSCULAR VOLUME: 95 FL (ref 80–100)
NON-UH HIE MEAN PLATELET VOLUME: 8.8 FL (ref 6.3–10.7)
NON-UH HIE MONOCYTES # (AUTO): 0.3 10*3/UL (ref 0–0.8)
NON-UH HIE MONOCYTES % (AUTO): 5.9 %
NON-UH HIE NEUTROPHILS # (AUTO): 4.2 10*3/UL (ref 1.8–7.7)
NON-UH HIE NEUTROPHILS % (AUTO): 70 %
NON-UH HIE NRBC%: 0.1 /100{WBC} (ref 0–0.5)
NON-UH HIE PARTIAL THROMBOPLASTIN TIME: 37.9 S (ref 25.1–36.5)
NON-UH HIE PLATELET COUNT: 297 10*3/UL (ref 150–450)
NON-UH HIE POTASSIUM: 4.9 MMOL/L (ref 3.5–5.1)
NON-UH HIE PROTHROMBIN TIME: 18.4 S (ref 9–12.9)
NON-UH HIE RED BLOOD COUNT: 3.96 10*6/UL (ref 3.6–5)
NON-UH HIE RED CELL DISTRIBUTION WIDTH: 12.9 % (ref 11.9–15.3)
NON-UH HIE SODIUM: 135 MMOL/L (ref 136–145)
NON-UH HIE TRIGLYCERIDE W/REFLEX: 109 MG/DL (ref 0–149)
NON-UH HIE UNCORRECTED WBC: 5.9 10*3/UL (ref 3.8–11.6)
NON-UH HIE VLDL CHOLESTEROL: 21 MG/DL
NON-UH HIE WHITE BLOOD COUNT: 5.9 10*3/UL (ref 3.8–11.6)

## 2025-06-09 DIAGNOSIS — Z95.818 STATUS POST PLACEMENT OF IMPLANTABLE LOOP RECORDER: ICD-10-CM

## 2025-06-09 DIAGNOSIS — I47.19 PAROXYSMAL ATRIAL TACHYCARDIA: ICD-10-CM

## 2025-06-09 NOTE — TELEPHONE ENCOUNTER
Received request for prescription refill for patient.  Patient follows with Dr. Tash Sutherland MD, FACC, FACP, RS     Request is for Eliquis   Is patient currently on medication- yes    Last OV- 2/7/25  Next OV- 10/14/25 with Dr. Stanley    Pended for signing and sent to provider.

## 2025-06-11 ENCOUNTER — TELEPHONE (OUTPATIENT)
Dept: CARDIOLOGY | Facility: CLINIC | Age: 80
End: 2025-06-11
Payer: MEDICARE

## 2025-06-11 NOTE — TELEPHONE ENCOUNTER
Patient phoned in to inquire how she is to get set up with Dr. Sutherland. Patient reports she is s/p cardiac cath with Dr. Cheng Stanley DO and was told afib was seen during her cath, that she was to follow up with EP.     Patient was previously on with Dr. Sutherland and had some pending visit with her, but looks like they were cancelled. Advised patient to contact Dr. Sutherland for opening to discuss the afib. Patient is on anticoag.     Will forward this to Dr. Sutherland to make her aware of the Afib that was found during cardiac cath.

## 2025-06-17 ENCOUNTER — TELEPHONE (OUTPATIENT)
Dept: CARDIOLOGY | Facility: CLINIC | Age: 80
End: 2025-06-17
Payer: MEDICARE

## 2025-06-17 RX ORDER — ASPIRIN 81 MG/1
81 TABLET ORAL DAILY
COMMUNITY
End: 2025-06-18 | Stop reason: SINTOL

## 2025-06-17 NOTE — TELEPHONE ENCOUNTER
Patient phoned in inquiring if she can discontinue the baby aspirin or if she can decrease the dose. Patient reports she takes daily right now and has noticed a lot of easy bruising. She reports she is taking the eliquis twice daily as well. TO Dr. Cheng Stanley, DO

## 2025-06-27 ENCOUNTER — APPOINTMENT (OUTPATIENT)
Dept: CARDIOLOGY | Facility: CLINIC | Age: 80
End: 2025-06-27
Payer: MEDICARE

## 2025-06-30 NOTE — PROGRESS NOTES
"  Patient ID: Lilliana Bryan is a 79 y.o. female who presents for Atrial Fibrillation (Patient states on June 9, 2025 patient went into atrial fibrillation during a heart cath at [facility]).  History of Present Illness  Lilliana Bryan is a 79 year old female with atrial fibrillation who presents for evaluation and management of her condition. She was referred by her general cardiologist for evaluation and management of atrial fibrillation.    In June, she underwent cardiac catheterization following a stress test. She had atrial fibrillation noted during cardiac cath.    She experiences persistent episodes of atrial fibrillation, with shortness of breath and difficulty breathing. A loop recorder shows atrial fibrillation approximately 14% of the time.    Her current medications include Eliquis and metoprolol, which was recently doubled. She discontinued aspirin due to bruising. During atrial fibrillation episodes, she does not experience dizziness or chest discomfort.    Patient has occasional syncope and had loop recorder replaced in February 2025.    PMHx:  See list    PSHx:  See list    Social Hx:  Social History[1]    Family Hx:  Family History[2]    Review of Systems   Cardiovascular:  Negative for chest pain, dyspnea on exertion and palpitations.   All other systems reviewed and are negative.        Objective     /62 (BP Location: Left arm, Patient Position: Sitting)   Pulse 75   Ht 1.6 m (5' 3\")   Wt 74.6 kg (164 lb 6.4 oz)   BMI 29.12 kg/m²        LABS:  CBC:   Lab Results   Component Value Date    WBC 5.9 02/27/2025    RBC 3.85 (L) 02/27/2025    HGB 12.3 02/27/2025    HCT 36.9 02/27/2025    MCV 96 02/27/2025    MCH 31.9 02/27/2025    MCHC 33.3 02/27/2025    RDW 12.4 02/27/2025     02/27/2025     CBC with Differential:    Lab Results   Component Value Date    WBC 5.9 02/27/2025    RBC 3.85 (L) 02/27/2025    HGB 12.3 02/27/2025    HCT 36.9 02/27/2025     02/27/2025    " "MCV 96 02/27/2025    MCH 31.9 02/27/2025    MCHC 33.3 02/27/2025    RDW 12.4 02/27/2025    NRBC 0.0 02/27/2025     CMP:    Lab Results   Component Value Date     02/27/2025    K 4.2 02/27/2025     02/27/2025    CO2 25 02/27/2025    BUN 12 02/27/2025    CREATININE 0.81 02/27/2025    GLUCOSE 92 02/27/2025    CALCIUM 8.8 02/27/2025     BMP:    Lab Results   Component Value Date     02/27/2025    K 4.2 02/27/2025     02/27/2025    CO2 25 02/27/2025    BUN 12 02/27/2025    CREATININE 0.81 02/27/2025    CALCIUM 8.8 02/27/2025    GLUCOSE 92 02/27/2025     Magnesium:No results found for: \"MG\"        Physical Exam  Constitutional:       General: Awake.      Appearance: Normal and healthy appearance. Well-developed and not in distress.   Neck:      Vascular: No JVR. JVD normal.   Pulmonary:      Effort: Pulmonary effort is normal.      Breath sounds: Normal breath sounds. No wheezing. No rhonchi. No rales.   Chest:      Chest wall: Not tender to palpatation.   Cardiovascular:      PMI at left midclavicular line. Normal rate. Regular rhythm. Normal S1. Normal S2.       Murmurs: There is no murmur.      No gallop.  No click. No rub.   Pulses:     Intact distal pulses.   Edema:     Peripheral edema absent.   Abdominal:      Tenderness: There is no abdominal tenderness.   Musculoskeletal: Normal range of motion.         General: No tenderness. Skin:     General: Skin is warm and dry.   Neurological:      General: No focal deficit present.      Mental Status: Alert and oriented to person, place and time.         Device check. See scanned.  ECG. See scanned.    Assessment & Plan  Recurrent Atrial Fibrillation  Persistent atrial fibrillation with 14% occurrence on loop recorder. Coronary arteries with no significant disease, normal heart structure. Discussed AF risks for stroke and congestive heart failure. Treatment options discussed. Shared decision making performed. Cryo PVI brochure reviewed. Discussed " hybrid procedure and atri clip also.  - Continue Eliquis for at least 30 days before procedure.  - Continue metoprolol as prescribed.  - Previously discontinued aspirin due to bruising.  - Plan cryoablation to minimize atrial fibrillation.   - Order CT scan of chest one month before procedure.  - Schedule REYES day before procedure.  - Perform cryoablation at [facility] with overnight monitoring.  Preop cardiac evaluation performed. Econsent obtained.  - Refer to Dr. Melgar for evaluation and potential hybrid procedure and atriclip.  - Avoid patient's scheduling conflicts in late July and early August.    Recurrent syncope. Likely vasovagal etiology and orthostatic etiology by history. Abnormal tilt table test for neurally mediated syncope with orthostatic blood pressure response in past. No significant arrhythmias except sinus tachycardia have been recorded. S/p  loop recorder replacement Feb 2025.  Remote EP study November 2020. Normal conduction. No inducible arrhythmias.    Counseled greater than 50% of the visit.  The patient and I discussed arrhythmia, ECG, shared decision making, preop cardiac evaluation, informed consent, referral to Dr. Melgar for hybrid procedure and atri-clip, treatment options, risk, benefits, and imponderables.  Lifestyle modifications reviewed.  All questions answered.  Patient appreciative of care  Assessment & Plan  Paroxysmal atrial tachycardia    Paroxysmal atrial fibrillation (Multi)    Status post placement of implantable loop recorder    Palpitations    Benign essential hypertension    Long term (current) use of anticoagulants    BMI 29.0-29.9,adult    Encounter for medication review and counseling    Encounter to discuss treatment options    Former smoker          This medical note was created with the assistance of artificial intelligence (AI) for documentation purposes. The content has been reviewed and confirmed by the healthcare provider for accuracy and completeness. Patient  consented to the use of audio recording and use of AI during their visit.     I, , personally performed the services described in the documentation as scribed by the nurse in my presence, and confirm it is both accurate and complete.     Total, 58 inclusive of preop cardiac evaluation, review of cryo PVI brochure. Extended time for preop cardiac evaluation and informed consent.       [1]   Social History  Socioeconomic History    Marital status:    Tobacco Use    Smoking status: Former     Current packs/day: 0.00     Types: Cigarettes     Quit date:      Years since quittin.5    Smokeless tobacco: Never   Substance and Sexual Activity    Alcohol use: Not Currently    Drug use: Never    Sexual activity: Defer   [2]   Family History  Problem Relation Name Age of Onset    Coronary artery disease Sister      Coronary artery disease Brother

## 2025-07-01 ENCOUNTER — APPOINTMENT (OUTPATIENT)
Dept: CARDIOLOGY | Facility: CLINIC | Age: 80
End: 2025-07-01
Payer: MEDICARE

## 2025-07-01 ENCOUNTER — HOSPITAL ENCOUNTER (OUTPATIENT)
Dept: CARDIOLOGY | Facility: HOSPITAL | Age: 80
Discharge: HOME | End: 2025-07-01
Payer: MEDICARE

## 2025-07-01 VITALS
HEIGHT: 63 IN | DIASTOLIC BLOOD PRESSURE: 62 MMHG | SYSTOLIC BLOOD PRESSURE: 112 MMHG | WEIGHT: 164.4 LBS | BODY MASS INDEX: 29.13 KG/M2 | HEART RATE: 75 BPM

## 2025-07-01 DIAGNOSIS — Z71.89 ENCOUNTER FOR MEDICATION REVIEW AND COUNSELING: ICD-10-CM

## 2025-07-01 DIAGNOSIS — Z79.01 LONG TERM (CURRENT) USE OF ANTICOAGULANTS: ICD-10-CM

## 2025-07-01 DIAGNOSIS — I10 BENIGN ESSENTIAL HYPERTENSION: ICD-10-CM

## 2025-07-01 DIAGNOSIS — Z95.818 STATUS POST PLACEMENT OF IMPLANTABLE LOOP RECORDER: ICD-10-CM

## 2025-07-01 DIAGNOSIS — R00.2 PALPITATIONS: ICD-10-CM

## 2025-07-01 DIAGNOSIS — Z71.89 ENCOUNTER TO DISCUSS TREATMENT OPTIONS: ICD-10-CM

## 2025-07-01 DIAGNOSIS — I47.19 PAROXYSMAL ATRIAL TACHYCARDIA: ICD-10-CM

## 2025-07-01 DIAGNOSIS — Z87.891 FORMER SMOKER: ICD-10-CM

## 2025-07-01 DIAGNOSIS — I48.0 PAROXYSMAL ATRIAL FIBRILLATION (MULTI): Primary | ICD-10-CM

## 2025-07-01 DIAGNOSIS — R55 SYNCOPE, VASOVAGAL: ICD-10-CM

## 2025-07-01 PROCEDURE — 1159F MED LIST DOCD IN RCRD: CPT | Performed by: INTERNAL MEDICINE

## 2025-07-01 PROCEDURE — 3074F SYST BP LT 130 MM HG: CPT | Performed by: INTERNAL MEDICINE

## 2025-07-01 PROCEDURE — 1036F TOBACCO NON-USER: CPT | Performed by: INTERNAL MEDICINE

## 2025-07-01 PROCEDURE — 93291 INTERROG DEV EVAL SCRMS IP: CPT | Performed by: INTERNAL MEDICINE

## 2025-07-01 PROCEDURE — 3078F DIAST BP <80 MM HG: CPT | Performed by: INTERNAL MEDICINE

## 2025-07-01 PROCEDURE — 93291 INTERROG DEV EVAL SCRMS IP: CPT

## 2025-07-01 PROCEDURE — 93000 ELECTROCARDIOGRAM COMPLETE: CPT | Mod: DISTINCT PROCEDURAL SERVICE | Performed by: INTERNAL MEDICINE

## 2025-07-01 PROCEDURE — 99215 OFFICE O/P EST HI 40 MIN: CPT | Performed by: INTERNAL MEDICINE

## 2025-07-01 RX ORDER — SODIUM CHLORIDE 9 MG/ML
100 INJECTION, SOLUTION INTRAVENOUS CONTINUOUS
OUTPATIENT
Start: 2025-07-01 | End: 2025-07-02

## 2025-07-01 RX ORDER — CHLORHEXIDINE GLUCONATE 40 MG/ML
SOLUTION TOPICAL ONCE
OUTPATIENT
Start: 2025-07-01 | End: 2025-07-01

## 2025-07-01 RX ORDER — MUPIROCIN 20 MG/G
1 OINTMENT TOPICAL ONCE
OUTPATIENT
Start: 2025-07-01 | End: 2025-07-01

## 2025-07-01 ASSESSMENT — COLUMBIA-SUICIDE SEVERITY RATING SCALE - C-SSRS
1. IN THE PAST MONTH, HAVE YOU WISHED YOU WERE DEAD OR WISHED YOU COULD GO TO SLEEP AND NOT WAKE UP?: NO
6. HAVE YOU EVER DONE ANYTHING, STARTED TO DO ANYTHING, OR PREPARED TO DO ANYTHING TO END YOUR LIFE?: NO
2. HAVE YOU ACTUALLY HAD ANY THOUGHTS OF KILLING YOURSELF?: NO

## 2025-07-01 ASSESSMENT — PATIENT HEALTH QUESTIONNAIRE - PHQ9
1. LITTLE INTEREST OR PLEASURE IN DOING THINGS: NOT AT ALL
SUM OF ALL RESPONSES TO PHQ9 QUESTIONS 1 AND 2: 0
2. FEELING DOWN, DEPRESSED OR HOPELESS: NOT AT ALL

## 2025-07-01 ASSESSMENT — ENCOUNTER SYMPTOMS
PALPITATIONS: 0
DYSPNEA ON EXERTION: 0

## 2025-07-01 NOTE — PATIENT INSTRUCTIONS
Pre-Procedure Patient Information    You have been scheduled for: CT, REYES, cryoablation  At: [facility]  With: Dr. Sutherland  Date of procedure:     1. Please have transportation to and from the hospital. While you should plan for same-day discharge there is a possibility you will need to stay overnight.    2. You will receive a call from the hospital 24 - 72 hours before your procedure providing you with fasting instructions, procedure location detail, and time of arrival.  If you have not received a call from the hospital by 6 pm the day before your scheduled procedure, please call [phone].    3. Please bring a current list of medications with you to the hospital.      4. Medications to hold:  NONE     - Otherwise, you may continue your medications in the morning with sips of water.     5. Nothing to eat after midnight before the procedure. OK to take morning medications, with above exceptions, the day of the procedure with a small sip of water.     6. Please bring to our attention if you have any contrast, latex or metal allergies.    7. Please have your blood work as instructed completed at least a day before your procedure.    8. If you have any questions, please contact the office at [phone].      Continue same medications/treatment.  Patient educated on proper medication use.  Patient educated on risk factor modification.  Please bring any lab results from other providers/physicians to your next appointment.    Please bring all medicines, vitamins, and herbal supplements with you when you come to the office.    Prescriptions will not be filled unless you are compliant with your follow up appointments or have a follow up appointment scheduled as per instruction of your physician. Refills should be requested at the time of your visit.    SCHEDULE cryoablation, CT, REYES. Obtain labs 1 week prior to procedure. Orders are in the system.   No medications to hold prior to the procedure.     LYLE VASQUES RN,  AM SCRIBING FOR AND IN THE PRESENCE OF DR. CARLYN BALDWIN MD, FACC, FACP, FHRS

## 2025-07-09 ENCOUNTER — TELEPHONE (OUTPATIENT)
Dept: CARDIOLOGY | Facility: CLINIC | Age: 80
End: 2025-07-09
Payer: MEDICARE

## 2025-07-09 NOTE — TELEPHONE ENCOUNTER
----- Message from Cheng Stanley sent at 7/7/2025 11:36 AM EDT -----  No new orders. Please call patient with normal result.  ----- Message -----  From: José Miguel Garcia - Lab Results In  Sent: 6/5/2025   1:15 PM EDT  To: Cheng Stanley, DO

## 2025-07-09 NOTE — TELEPHONE ENCOUNTER
Result Communication    Resulted Orders   [facility] Complete Blood Count Auto Diff   Result Value Ref Range    [facility] White Blood Count 5.9 3.8 - 11.6 10*3/uL    [facility] Uncorrected WBC 5.9 3.8 - 11.6 10*3/uL    [facility] Red Blood Count 3.96 3.60 - 5.00 10*6/uL    [facility] Hemoglobin 12.8 11.8 - 15.4 g/dL    [facility] Hematocrit 37.6 34.0 - 46.4 %    [facility] Mean Corpuscular Volume 95.0 80 - 100 fL    [facility] Mean Corpuscular Hemoglobin 32.3 24.7 - 34.3 pg    [facility] Mean Corpuscular HGB Conc 34.0 32.0 - 35.0 g/dL    [facility] Red Cell Distribution Width 12.9 11.9 - 15.3 %    [facility] Platelet Count 297 150 - 450 10*3/uL    [facility] Mean Platelet Volume 8.8 6.3 - 10.7 fL    [facility] Neutrophils % (Auto) 70.0 . %    [facility] Lymphocytes % (Auto) 21.5 . %    [facility] Monocytes % (Auto) 5.9 . %    [facility] Eosinophils % (Auto) 1.1 . %    [facility] Basophils % (Auto) 1.5 . %    [facility] NRBC% 0.1 0 - 0.5 /100[WBC]    [facility] Neutrophils # (Auto) 4.2 1.8 - 7.7 10*3/uL    [facility] Lymphocytes # (Auto) 1.3 1.00 - 4.8 10*3/uL    [facility] Monocytes # (Auto) 0.3 0.0 - 0.8 10*3/uL    [facility] Eosinophils # (Auto) 0.1 0.0 - 0.45 10*3/uL    [facility] Basophils # (Auto) 0.1 0.0 - 0.2 10*3/uL      Comment:      PERFORMED BY:[facility] [id number] [place], [place] [id number] MEDICAL DIRECTOREDILBERTO CASTILLO M.D.   [facility] Coagulation Profile   Result Value Ref Range    [facility] Prothrombin Time 18.4 (H) 9.0 - 12.9 s      Comment:         A hematocrit value greater than 55% may lead to inaccurate   results in coagulation testing. Patients having hematocrit   values >55% require a special collection tube for   coagulation studies.   Please contact the laboratory at [phone] for redraw   instructions.    [facility] INR 1.6       Comment:         INR Therapeutic Range   A) Pre- and Peroperative OAT started two weeks before   surgery.                      NOT HIP SURGERY:   1.5 - 2.5                                HIP SURGERY:        2  -  3   B) Primary and secondary prevention of venous                                THROMBOSIS:         2  -  3   C) Active venous thrombosis, pulmonary embolism   and prevention of recurrent venous thrombosis:   2  -  3      D) Prevention of arterial thromboembolism   including patients with mechanical heart valves: 3  - 4.5    [facility] Partial Thromboplastin Time 37.9 (H) 25.1 - 36.5 s      Comment:         A hematocrit value greater than 55% may lead to inaccurate   results in coagulation testing. Patients having hematocrit   values >55% require a special collection tube for   coagulation studies.   Please contact the laboratory at [phone] for redraw   instructions.PERFORMED BY:[facility] [id number] [place], [place] [id number] MEDICAL DIRECTOREDILBERTO CASTILLO M.D.   [facility] Electrolytes   Result Value Ref Range    [facility] Sodium 135 (L) 136 - 145 mmol/L    [facility] Potassium 4.9 3.5 - 5.1 mmol/L    [facility] Chloride 103 98 - 107 mmol/L    [facility] Carbon Dioxide 26.1 21.0 - 31.0 mmol/L    [facility] Anion Gap 10.8 6.0 - 15.0 meq/L   [facility] Blood Urea Nitrogen   Result Value Ref Range    [facility] Blood Urea Nitrogen 10 7 - 25 mg/dL   [facility] Creatinine   Result Value Ref Range    [facility] Creatinine 0.71 0.60 - 1.20 mg/dL    [facility] ESTIMATED GFR >60.0 mL/Min   [facility] Lipid Panel   Result Value Ref Range    [facility] Cholesterol 172 140 - 200 mg/dL      Comment:         Chol less than 200 mg/dl      low risk   Chol 201-239 mg/dl            borderline risk   Chol 240 mg/dl and greater    high risk    [facility] HDL Cholesterol 61 23 - 92 mg/dL      Comment:         HDL CHOL ATP-III CLASSIFICATION                              Cardiovascular                                  Risk      HDL > or equal to 60 mg/dL     LOW   HDL < 40 mg/dL                  HIGH    [facility] Triglyceride w/Reflex 109 0 - 149 mg/dL      Comment:         TRIG ATP III CLASSIFICATION   TRIG less than 150 mg/dL      Normal   TRIG 150-199 mg/dL            Borderline high   TRIG 200-500 mg/dL            High   TRIG greater than 500 mg/dL   Very high   Standard traceable to the Center for Disease Conrtrol and   Prevention (CDC) test method.    [facility] LDL Cholesterol,Calculated 89 0 - 100 mg/dL      Comment:         LDL ATP III CLASSIFICATION   LDL less than 100 mg/dL       Optimal   -129 mg/dL             Near or above optimal   -159 mg/dL             Borderline high   -189 mg/dL             High   LDL greater than 189 mg/dL    Very high    [facility] VLDL CHOLESTEROL 21 mg/dL    [facility] Chol/HDL Ratio 2.8 <5.0      Comment:      PERFORMED BY:[facility] [place], [place] [id number] MEDICAL DIRECTOREDILBERTO CASTILLO M.D.       3:42 PM      Results were successfully communicated with the patient and they acknowledged their understanding.

## 2025-07-15 ENCOUNTER — APPOINTMENT (OUTPATIENT)
Dept: CARDIOLOGY | Facility: CLINIC | Age: 80
End: 2025-07-15
Payer: MEDICARE

## 2025-07-15 ENCOUNTER — APPOINTMENT (OUTPATIENT)
Dept: CARDIOLOGY | Facility: HOSPITAL | Age: 80
End: 2025-07-15
Payer: MEDICARE

## 2025-07-23 ENCOUNTER — TELEPHONE (OUTPATIENT)
Dept: CARDIOLOGY | Facility: CLINIC | Age: 80
End: 2025-07-23
Payer: MEDICARE

## 2025-07-23 NOTE — TELEPHONE ENCOUNTER
I called patient after she left message that she needs lab orders before procedure. I called patient and she wants lab orders sent to  [facility]. I printed them and had Catalina BELTRAN sign and faxed to [phone].I called patient and informed.

## 2025-07-24 LAB
NON-UH HIE AGAP: 9 MEQ/L (ref 6–16)
NON-UH HIE BUN/CREAT RATIO: 18 NO UNITS (ref 10–20)
NON-UH HIE BUN: 14 MG/DL (ref 5–21)
NON-UH HIE CALCIUM LVL: 9.4 MG/DL (ref 8.9–11.1)
NON-UH HIE CHLORIDE: 100 MMOL/L (ref 101–111)
NON-UH HIE CO2: 28 MMOL/L (ref 21–31)
NON-UH HIE CREATININE: 0.8 MG/DL (ref 0.5–1.3)
NON-UH HIE EGFR: 75 ML/MIN/1.73 M2
NON-UH HIE GLUCOSE LVL: 120 MG/DL (ref 55–199)
NON-UH HIE HCT: 37.5 % (ref 34–46)
NON-UH HIE HGB: 12.7 GM/DL (ref 12–16)
NON-UH HIE INR: 1.41
NON-UH HIE MCH: 32.2 PG (ref 27–34)
NON-UH HIE MCHC: 34 GM/DL (ref 31.4–36)
NON-UH HIE MCV: 94.8 FL (ref 80–100)
NON-UH HIE MPV: 9 FL (ref 6.4–10.8)
NON-UH HIE PLATELET: 300 E9/L (ref 150–500)
NON-UH HIE POTASSIUM LVL: 4.4 MMOL/L (ref 3.5–5.3)
NON-UH HIE PT: 15.8 SECOND(S) (ref 9.4–12.5)
NON-UH HIE RBC MORPH: NORMAL
NON-UH HIE RBC: 4 E12/L (ref 4.3–5.9)
NON-UH HIE RDW: 12.6 % (ref 10.9–14.2)
NON-UH HIE SODIUM LVL: 133 MMOL/L (ref 135–145)
NON-UH HIE WBC: 5.9 E9/L (ref 4–11)

## 2025-07-25 ENCOUNTER — APPOINTMENT (OUTPATIENT)
Dept: RADIOLOGY | Facility: CLINIC | Age: 80
End: 2025-07-25
Payer: MEDICARE

## 2025-07-31 ENCOUNTER — HOSPITAL ENCOUNTER (OUTPATIENT)
Dept: RADIOLOGY | Facility: CLINIC | Age: 80
Discharge: HOME | End: 2025-07-31
Payer: MEDICARE

## 2025-07-31 DIAGNOSIS — I48.0 PAROXYSMAL ATRIAL FIBRILLATION (MULTI): ICD-10-CM

## 2025-07-31 PROCEDURE — 71275 CT ANGIOGRAPHY CHEST: CPT

## 2025-07-31 PROCEDURE — 2550000001 HC RX 255 CONTRASTS: Performed by: INTERNAL MEDICINE

## 2025-07-31 RX ADMIN — IOHEXOL 80 ML: 350 INJECTION, SOLUTION INTRAVENOUS at 10:59

## 2025-08-13 ENCOUNTER — HOSPITAL ENCOUNTER (OUTPATIENT)
Dept: CARDIOLOGY | Facility: HOSPITAL | Age: 80
Discharge: HOME | End: 2025-08-13
Payer: MEDICARE

## 2025-08-13 ENCOUNTER — HOSPITAL ENCOUNTER (OUTPATIENT)
Dept: RADIOLOGY | Facility: HOSPITAL | Age: 80
Discharge: HOME | End: 2025-08-13
Payer: MEDICARE

## 2025-08-13 DIAGNOSIS — I48.0 PAROXYSMAL ATRIAL FIBRILLATION (MULTI): ICD-10-CM

## 2025-08-13 LAB
ABO GROUP (TYPE) IN BLOOD: NORMAL
ALBUMIN SERPL BCP-MCNC: 4.6 G/DL (ref 3.4–5)
ALP SERPL-CCNC: 42 U/L (ref 33–136)
ALT SERPL W P-5'-P-CCNC: 13 U/L (ref 7–45)
ANION GAP SERPL CALC-SCNC: 11 MMOL/L (ref 10–20)
ANTIBODY SCREEN: NORMAL
APTT PPP: 39 SECONDS (ref 26–36)
AST SERPL W P-5'-P-CCNC: 22 U/L (ref 9–39)
BASOPHILS # BLD AUTO: 0.04 X10*3/UL (ref 0–0.1)
BASOPHILS NFR BLD AUTO: 0.8 %
BILIRUB SERPL-MCNC: 0.6 MG/DL (ref 0–1.2)
BUN SERPL-MCNC: 10 MG/DL (ref 6–23)
CALCIUM SERPL-MCNC: 9.2 MG/DL (ref 8.6–10.3)
CHLORIDE SERPL-SCNC: 103 MMOL/L (ref 98–107)
CO2 SERPL-SCNC: 25 MMOL/L (ref 21–32)
CREAT SERPL-MCNC: 0.77 MG/DL (ref 0.5–1.05)
EGFRCR SERPLBLD CKD-EPI 2021: 80 ML/MIN/1.73M*2
EOSINOPHIL # BLD AUTO: 0.07 X10*3/UL (ref 0–0.4)
EOSINOPHIL NFR BLD AUTO: 1.4 %
ERYTHROCYTE [DISTWIDTH] IN BLOOD BY AUTOMATED COUNT: 12.3 % (ref 11.5–14.5)
GLUCOSE SERPL-MCNC: 88 MG/DL (ref 74–99)
HCT VFR BLD AUTO: 38.3 % (ref 36–46)
HGB BLD-MCNC: 12.9 G/DL (ref 12–16)
IMM GRANULOCYTES # BLD AUTO: 0.01 X10*3/UL (ref 0–0.5)
IMM GRANULOCYTES NFR BLD AUTO: 0.2 % (ref 0–0.9)
INR PPP: 1.7 (ref 0.9–1.1)
LYMPHOCYTES # BLD AUTO: 1.14 X10*3/UL (ref 0.8–3)
LYMPHOCYTES NFR BLD AUTO: 22.4 %
MCH RBC QN AUTO: 32.3 PG (ref 26–34)
MCHC RBC AUTO-ENTMCNC: 33.7 G/DL (ref 32–36)
MCV RBC AUTO: 96 FL (ref 80–100)
MONOCYTES # BLD AUTO: 0.34 X10*3/UL (ref 0.05–0.8)
MONOCYTES NFR BLD AUTO: 6.7 %
NEUTROPHILS # BLD AUTO: 3.5 X10*3/UL (ref 1.6–5.5)
NEUTROPHILS NFR BLD AUTO: 68.5 %
NRBC BLD-RTO: 0 /100 WBCS (ref 0–0)
PLATELET # BLD AUTO: 323 X10*3/UL (ref 150–450)
POTASSIUM SERPL-SCNC: 4.4 MMOL/L (ref 3.5–5.3)
PROT SERPL-MCNC: 7.4 G/DL (ref 6.4–8.2)
PROTHROMBIN TIME: 18.3 SECONDS (ref 9.8–12.4)
RBC # BLD AUTO: 4 X10*6/UL (ref 4–5.2)
RH FACTOR (ANTIGEN D): NORMAL
SODIUM SERPL-SCNC: 135 MMOL/L (ref 136–145)
WBC # BLD AUTO: 5.1 X10*3/UL (ref 4.4–11.3)

## 2025-08-13 PROCEDURE — 93319 3D ECHO IMG CGEN CAR ANOMAL: CPT | Performed by: INTERNAL MEDICINE

## 2025-08-13 PROCEDURE — 93320 DOPPLER ECHO COMPLETE: CPT | Performed by: INTERNAL MEDICINE

## 2025-08-13 PROCEDURE — 36415 COLL VENOUS BLD VENIPUNCTURE: CPT | Performed by: NURSE PRACTITIONER

## 2025-08-13 PROCEDURE — 93319 3D ECHO IMG CGEN CAR ANOMAL: CPT

## 2025-08-13 PROCEDURE — 99223 1ST HOSP IP/OBS HIGH 75: CPT | Performed by: NURSE PRACTITIONER

## 2025-08-13 PROCEDURE — 93010 ELECTROCARDIOGRAM REPORT: CPT | Performed by: INTERNAL MEDICINE

## 2025-08-13 PROCEDURE — 99152 MOD SED SAME PHYS/QHP 5/>YRS: CPT

## 2025-08-13 PROCEDURE — 80053 COMPREHEN METABOLIC PANEL: CPT | Performed by: NURSE PRACTITIONER

## 2025-08-13 PROCEDURE — 7100000002 HC RECOVERY ROOM TIME - EACH INCREMENTAL 1 MINUTE

## 2025-08-13 PROCEDURE — 7100000001 HC RECOVERY ROOM TIME - INITIAL BASE CHARGE

## 2025-08-13 PROCEDURE — 93312 ECHO TRANSESOPHAGEAL: CPT | Performed by: INTERNAL MEDICINE

## 2025-08-13 PROCEDURE — 2500000004 HC RX 250 GENERAL PHARMACY W/ HCPCS (ALT 636 FOR OP/ED): Performed by: INTERNAL MEDICINE

## 2025-08-13 PROCEDURE — 86901 BLOOD TYPING SEROLOGIC RH(D): CPT | Performed by: NURSE PRACTITIONER

## 2025-08-13 PROCEDURE — 71046 X-RAY EXAM CHEST 2 VIEWS: CPT

## 2025-08-13 PROCEDURE — 93005 ELECTROCARDIOGRAM TRACING: CPT

## 2025-08-13 PROCEDURE — 99152 MOD SED SAME PHYS/QHP 5/>YRS: CPT | Performed by: INTERNAL MEDICINE

## 2025-08-13 PROCEDURE — 2500000005 HC RX 250 GENERAL PHARMACY W/O HCPCS: Performed by: INTERNAL MEDICINE

## 2025-08-13 PROCEDURE — 7100000009 HC PHASE TWO TIME - INITIAL BASE CHARGE

## 2025-08-13 PROCEDURE — 7100000010 HC PHASE TWO TIME - EACH INCREMENTAL 1 MINUTE

## 2025-08-13 PROCEDURE — 85610 PROTHROMBIN TIME: CPT | Performed by: NURSE PRACTITIONER

## 2025-08-13 PROCEDURE — 85025 COMPLETE CBC W/AUTO DIFF WBC: CPT | Performed by: NURSE PRACTITIONER

## 2025-08-13 RX ORDER — MIDAZOLAM HYDROCHLORIDE 1 MG/ML
INJECTION, SOLUTION INTRAMUSCULAR; INTRAVENOUS AS NEEDED
Status: DISCONTINUED | OUTPATIENT
Start: 2025-08-13 | End: 2025-08-13 | Stop reason: HOSPADM

## 2025-08-13 RX ORDER — FENTANYL CITRATE 50 UG/ML
INJECTION, SOLUTION INTRAMUSCULAR; INTRAVENOUS AS NEEDED
Status: DISCONTINUED | OUTPATIENT
Start: 2025-08-13 | End: 2025-08-13 | Stop reason: HOSPADM

## 2025-08-13 RX ADMIN — FENTANYL CITRATE 50 MCG: 50 INJECTION, SOLUTION INTRAMUSCULAR; INTRAVENOUS at 13:21

## 2025-08-13 RX ADMIN — Medication 1 APPLICATION: at 13:08

## 2025-08-13 RX ADMIN — MIDAZOLAM 1 MG: 1 INJECTION INTRAMUSCULAR; INTRAVENOUS at 13:22

## 2025-08-13 RX ADMIN — BENZOCAINE, BUTAMBEN, AND TETRACAINE HYDROCHLORIDE 2 SPRAY: .028; .004; .004 AEROSOL, SPRAY TOPICAL at 13:07

## 2025-08-13 RX ADMIN — MIDAZOLAM 1 MG: 1 INJECTION INTRAMUSCULAR; INTRAVENOUS at 13:21

## 2025-08-13 ASSESSMENT — ENCOUNTER SYMPTOMS
PSYCHIATRIC NEGATIVE: 1
ENDOCRINE NEGATIVE: 1
HEMATOLOGIC/LYMPHATIC NEGATIVE: 1
EYES NEGATIVE: 1
DIZZINESS: 0
NEUROLOGICAL NEGATIVE: 1
CHEST TIGHTNESS: 0
PALPITATIONS: 1
FATIGUE: 1
SHORTNESS OF BREATH: 1
LIGHT-HEADEDNESS: 0
WEAKNESS: 0
ALLERGIC/IMMUNOLOGIC NEGATIVE: 1
GASTROINTESTINAL NEGATIVE: 1
MUSCULOSKELETAL NEGATIVE: 1

## 2025-08-13 ASSESSMENT — PAIN SCALES - GENERAL
PAINLEVEL_OUTOF10: 0 - NO PAIN

## 2025-08-13 ASSESSMENT — PAIN - FUNCTIONAL ASSESSMENT: PAIN_FUNCTIONAL_ASSESSMENT: 0-10

## 2025-08-14 ENCOUNTER — APPOINTMENT (OUTPATIENT)
Dept: CARDIOLOGY | Facility: HOSPITAL | Age: 80
End: 2025-08-14
Payer: MEDICARE

## 2025-08-14 ENCOUNTER — HOSPITAL ENCOUNTER (OUTPATIENT)
Facility: HOSPITAL | Age: 80
Discharge: HOME | End: 2025-08-15
Attending: INTERNAL MEDICINE | Admitting: INTERNAL MEDICINE
Payer: MEDICARE

## 2025-08-14 ENCOUNTER — ANESTHESIA EVENT (OUTPATIENT)
Dept: CARDIOLOGY | Facility: HOSPITAL | Age: 80
End: 2025-08-14
Payer: MEDICARE

## 2025-08-14 ENCOUNTER — ANESTHESIA (OUTPATIENT)
Dept: CARDIOLOGY | Facility: HOSPITAL | Age: 80
End: 2025-08-14
Payer: MEDICARE

## 2025-08-14 VITALS
DIASTOLIC BLOOD PRESSURE: 65 MMHG | TEMPERATURE: 97.9 F | SYSTOLIC BLOOD PRESSURE: 146 MMHG | OXYGEN SATURATION: 99 % | RESPIRATION RATE: 18 BRPM | WEIGHT: 161.16 LBS | BODY MASS INDEX: 28.55 KG/M2 | HEIGHT: 63 IN | HEART RATE: 73 BPM

## 2025-08-14 PROCEDURE — 2500000005 HC RX 250 GENERAL PHARMACY W/O HCPCS: Performed by: NURSE ANESTHETIST, CERTIFIED REGISTERED

## 2025-08-14 PROCEDURE — 2500000004 HC RX 250 GENERAL PHARMACY W/ HCPCS (ALT 636 FOR OP/ED): Performed by: NURSE ANESTHETIST, CERTIFIED REGISTERED

## 2025-08-14 RX ORDER — SODIUM CHLORIDE, SODIUM LACTATE, POTASSIUM CHLORIDE, CALCIUM CHLORIDE 600; 310; 30; 20 MG/100ML; MG/100ML; MG/100ML; MG/100ML
INJECTION, SOLUTION INTRAVENOUS CONTINUOUS PRN
Status: DISCONTINUED | OUTPATIENT
Start: 2025-08-14 | End: 2025-08-14

## 2025-08-14 RX ORDER — FENTANYL CITRATE 50 UG/ML
INJECTION, SOLUTION INTRAMUSCULAR; INTRAVENOUS AS NEEDED
Status: DISCONTINUED | OUTPATIENT
Start: 2025-08-14 | End: 2025-08-14

## 2025-08-14 RX ORDER — FUROSEMIDE 10 MG/ML
INJECTION INTRAMUSCULAR; INTRAVENOUS AS NEEDED
Status: DISCONTINUED | OUTPATIENT
Start: 2025-08-14 | End: 2025-08-14

## 2025-08-14 RX ORDER — LIDOCAINE HYDROCHLORIDE 20 MG/ML
INJECTION, SOLUTION INFILTRATION; PERINEURAL AS NEEDED
Status: DISCONTINUED | OUTPATIENT
Start: 2025-08-14 | End: 2025-08-14

## 2025-08-14 RX ORDER — PROTAMINE SULFATE 10 MG/ML
INJECTION, SOLUTION INTRAVENOUS AS NEEDED
Status: DISCONTINUED | OUTPATIENT
Start: 2025-08-14 | End: 2025-08-14

## 2025-08-14 RX ORDER — SUCCINYLCHOLINE CHLORIDE 100 MG/5ML
SYRINGE (ML) INTRAVENOUS AS NEEDED
Status: DISCONTINUED | OUTPATIENT
Start: 2025-08-14 | End: 2025-08-14

## 2025-08-14 RX ORDER — PHENYLEPHRINE 10 MG/250 ML(40 MCG/ML)IN 0.9 % SOD.CHLORIDE INTRAVENOUS
CONTINUOUS PRN
Status: DISCONTINUED | OUTPATIENT
Start: 2025-08-14 | End: 2025-08-14

## 2025-08-14 RX ORDER — ONDANSETRON HYDROCHLORIDE 2 MG/ML
INJECTION, SOLUTION INTRAVENOUS AS NEEDED
Status: DISCONTINUED | OUTPATIENT
Start: 2025-08-14 | End: 2025-08-14

## 2025-08-14 RX ORDER — DEXAMETHASONE SODIUM PHOSPHATE 10 MG/ML
INJECTION INTRAMUSCULAR; INTRAVENOUS AS NEEDED
Status: DISCONTINUED | OUTPATIENT
Start: 2025-08-14 | End: 2025-08-14

## 2025-08-14 RX ORDER — HEPARIN SODIUM 1000 [USP'U]/ML
INJECTION, SOLUTION INTRAVENOUS; SUBCUTANEOUS AS NEEDED
Status: DISCONTINUED | OUTPATIENT
Start: 2025-08-14 | End: 2025-08-14

## 2025-08-14 RX ORDER — PHENYLEPHRINE HCL IN 0.9% NACL 0.4MG/10ML
SYRINGE (ML) INTRAVENOUS AS NEEDED
Status: DISCONTINUED | OUTPATIENT
Start: 2025-08-14 | End: 2025-08-14

## 2025-08-14 RX ORDER — PROPOFOL 10 MG/ML
INJECTION, EMULSION INTRAVENOUS AS NEEDED
Status: DISCONTINUED | OUTPATIENT
Start: 2025-08-14 | End: 2025-08-14

## 2025-08-14 RX ADMIN — Medication 100 MCG: at 11:18

## 2025-08-14 RX ADMIN — PROTAMINE SULFATE 5 MG: 10 INJECTION, SOLUTION INTRAVENOUS at 12:48

## 2025-08-14 RX ADMIN — SODIUM CHLORIDE, SODIUM LACTATE, POTASSIUM CHLORIDE, CALCIUM CHLORIDE: 600; 310; 30; 20 INJECTION, SOLUTION INTRAVENOUS at 10:45

## 2025-08-14 RX ADMIN — PROTAMINE SULFATE 35 MG: 10 INJECTION, SOLUTION INTRAVENOUS at 12:50

## 2025-08-14 RX ADMIN — PROPOFOL 50 MG: 10 INJECTION, EMULSION INTRAVENOUS at 11:20

## 2025-08-14 RX ADMIN — Medication 120 MG: at 11:06

## 2025-08-14 RX ADMIN — LIDOCAINE HYDROCHLORIDE 60 MG: 20 INJECTION, SOLUTION INFILTRATION; PERINEURAL at 11:06

## 2025-08-14 RX ADMIN — HEPARIN SODIUM 10000 UNITS: 1000 INJECTION, SOLUTION INTRAVENOUS; SUBCUTANEOUS at 11:38

## 2025-08-14 RX ADMIN — PROPOFOL 150 MG: 10 INJECTION, EMULSION INTRAVENOUS at 11:06

## 2025-08-14 RX ADMIN — DEXAMETHASONE SODIUM PHOSPHATE 8 MG: 10 INJECTION INTRAMUSCULAR; INTRAVENOUS at 11:40

## 2025-08-14 RX ADMIN — Medication 100 MCG: at 11:25

## 2025-08-14 RX ADMIN — FUROSEMIDE 20 MG: 10 INJECTION, SOLUTION INTRAMUSCULAR; INTRAVENOUS at 12:49

## 2025-08-14 RX ADMIN — FENTANYL CITRATE 25 MCG: 50 INJECTION, SOLUTION INTRAMUSCULAR; INTRAVENOUS at 11:06

## 2025-08-14 RX ADMIN — ONDANSETRON 4 MG: 2 INJECTION, SOLUTION INTRAMUSCULAR; INTRAVENOUS at 12:51

## 2025-08-14 RX ADMIN — PHENYLEPHRINE 10 MG/250 ML(40 MCG/ML)IN 0.9 % SOD.CHLORIDE INTRAVENOUS 0.2 MCG/KG/MIN: at 11:27

## 2025-08-14 RX ADMIN — SODIUM CHLORIDE, POTASSIUM CHLORIDE, SODIUM LACTATE AND CALCIUM CHLORIDE: 600; 310; 30; 20 INJECTION, SOLUTION INTRAVENOUS at 10:45

## 2025-08-14 SDOH — ECONOMIC STABILITY: FOOD INSECURITY: HOW HARD IS IT FOR YOU TO PAY FOR THE VERY BASICS LIKE FOOD, HOUSING, MEDICAL CARE, AND HEATING?: NOT HARD AT ALL

## 2025-08-14 SDOH — SOCIAL STABILITY: SOCIAL INSECURITY: WITHIN THE LAST YEAR, HAVE YOU BEEN HUMILIATED OR EMOTIONALLY ABUSED IN OTHER WAYS BY YOUR PARTNER OR EX-PARTNER?: NO

## 2025-08-14 SDOH — ECONOMIC STABILITY: INCOME INSECURITY: IN THE PAST 12 MONTHS HAS THE ELECTRIC, GAS, OIL, OR WATER COMPANY THREATENED TO SHUT OFF SERVICES IN YOUR HOME?: NO

## 2025-08-14 SDOH — ECONOMIC STABILITY: HOUSING INSECURITY: IN THE PAST 12 MONTHS, HOW MANY TIMES HAVE YOU MOVED WHERE YOU WERE LIVING?: 0

## 2025-08-14 SDOH — HEALTH STABILITY: MENTAL HEALTH: HOW OFTEN DO YOU HAVE A DRINK CONTAINING ALCOHOL?: NEVER

## 2025-08-14 SDOH — ECONOMIC STABILITY: FOOD INSECURITY: WITHIN THE PAST 12 MONTHS, THE FOOD YOU BOUGHT JUST DIDN'T LAST AND YOU DIDN'T HAVE MONEY TO GET MORE.: NEVER TRUE

## 2025-08-14 SDOH — SOCIAL STABILITY: SOCIAL INSECURITY: ARE THERE ANY APPARENT SIGNS OF INJURIES/BEHAVIORS THAT COULD BE RELATED TO ABUSE/NEGLECT?: NO

## 2025-08-14 SDOH — SOCIAL STABILITY: SOCIAL INSECURITY: HAVE YOU HAD THOUGHTS OF HARMING ANYONE ELSE?: NO

## 2025-08-14 SDOH — ECONOMIC STABILITY: HOUSING INSECURITY: IN THE LAST 12 MONTHS, WAS THERE A TIME WHEN YOU WERE NOT ABLE TO PAY THE MORTGAGE OR RENT ON TIME?: NO

## 2025-08-14 SDOH — SOCIAL STABILITY: SOCIAL INSECURITY
WITHIN THE LAST YEAR, HAVE YOU BEEN RAPED OR FORCED TO HAVE ANY KIND OF SEXUAL ACTIVITY BY YOUR PARTNER OR EX-PARTNER?: NO

## 2025-08-14 SDOH — HEALTH STABILITY: MENTAL HEALTH: HOW OFTEN DO YOU HAVE SIX OR MORE DRINKS ON ONE OCCASION?: NEVER

## 2025-08-14 SDOH — SOCIAL STABILITY: SOCIAL INSECURITY: DO YOU FEEL UNSAFE GOING BACK TO THE PLACE WHERE YOU ARE LIVING?: NO

## 2025-08-14 SDOH — HEALTH STABILITY: MENTAL HEALTH: HOW MANY DRINKS CONTAINING ALCOHOL DO YOU HAVE ON A TYPICAL DAY WHEN YOU ARE DRINKING?: PATIENT DOES NOT DRINK

## 2025-08-14 SDOH — ECONOMIC STABILITY: HOUSING INSECURITY: AT ANY TIME IN THE PAST 12 MONTHS, WERE YOU HOMELESS OR LIVING IN A SHELTER (INCLUDING NOW)?: NO

## 2025-08-14 SDOH — SOCIAL STABILITY: SOCIAL INSECURITY: WITHIN THE LAST YEAR, HAVE YOU BEEN AFRAID OF YOUR PARTNER OR EX-PARTNER?: NO

## 2025-08-14 SDOH — SOCIAL STABILITY: SOCIAL INSECURITY
WITHIN THE LAST YEAR, HAVE YOU BEEN KICKED, HIT, SLAPPED, OR OTHERWISE PHYSICALLY HURT BY YOUR PARTNER OR EX-PARTNER?: NO

## 2025-08-14 SDOH — SOCIAL STABILITY: SOCIAL INSECURITY: WERE YOU ABLE TO COMPLETE ALL THE BEHAVIORAL HEALTH SCREENINGS?: NO

## 2025-08-14 SDOH — ECONOMIC STABILITY: FOOD INSECURITY: WITHIN THE PAST 12 MONTHS, YOU WORRIED THAT YOUR FOOD WOULD RUN OUT BEFORE YOU GOT THE MONEY TO BUY MORE.: NEVER TRUE

## 2025-08-14 SDOH — SOCIAL STABILITY: SOCIAL INSECURITY: DOES ANYONE TRY TO KEEP YOU FROM HAVING/CONTACTING OTHER FRIENDS OR DOING THINGS OUTSIDE YOUR HOME?: NO

## 2025-08-14 SDOH — SOCIAL STABILITY: SOCIAL INSECURITY: DO YOU FEEL ANYONE HAS EXPLOITED OR TAKEN ADVANTAGE OF YOU FINANCIALLY OR OF YOUR PERSONAL PROPERTY?: NO

## 2025-08-14 SDOH — SOCIAL STABILITY: SOCIAL INSECURITY: HAS ANYONE EVER THREATENED TO HURT YOUR FAMILY OR YOUR PETS?: NO

## 2025-08-14 SDOH — SOCIAL STABILITY: SOCIAL INSECURITY: ABUSE: ADULT

## 2025-08-14 SDOH — SOCIAL STABILITY: SOCIAL INSECURITY: ARE YOU OR HAVE YOU BEEN THREATENED OR ABUSED PHYSICALLY, EMOTIONALLY, OR SEXUALLY BY ANYONE?: NO

## 2025-08-14 SDOH — ECONOMIC STABILITY: TRANSPORTATION INSECURITY: IN THE PAST 12 MONTHS, HAS LACK OF TRANSPORTATION KEPT YOU FROM MEDICAL APPOINTMENTS OR FROM GETTING MEDICATIONS?: NO

## 2025-08-14 SDOH — HEALTH STABILITY: MENTAL HEALTH: CURRENT SMOKER: 0

## 2025-08-14 ASSESSMENT — COGNITIVE AND FUNCTIONAL STATUS - GENERAL
WALKING IN HOSPITAL ROOM: A LITTLE
DAILY ACTIVITIY SCORE: 24
PATIENT BASELINE BEDBOUND: NO
MOBILITY SCORE: 22
CLIMB 3 TO 5 STEPS WITH RAILING: A LITTLE
DAILY ACTIVITIY SCORE: 24
MOBILITY SCORE: 22
CLIMB 3 TO 5 STEPS WITH RAILING: A LITTLE
WALKING IN HOSPITAL ROOM: A LITTLE

## 2025-08-14 ASSESSMENT — ACTIVITIES OF DAILY LIVING (ADL)
HEARING - RIGHT EAR: FUNCTIONAL
PATIENT'S MEMORY ADEQUATE TO SAFELY COMPLETE DAILY ACTIVITIES?: YES
FEEDING YOURSELF: INDEPENDENT
BATHING: INDEPENDENT
TOILETING: INDEPENDENT
LACK_OF_TRANSPORTATION: NO
GROOMING: INDEPENDENT
JUDGMENT_ADEQUATE_SAFELY_COMPLETE_DAILY_ACTIVITIES: YES
ADEQUATE_TO_COMPLETE_ADL: YES
HEARING - LEFT EAR: FUNCTIONAL
DRESSING YOURSELF: INDEPENDENT
WALKS IN HOME: INDEPENDENT

## 2025-08-14 ASSESSMENT — PAIN SCALES - GENERAL
PAINLEVEL_OUTOF10: 0 - NO PAIN
PAIN_LEVEL: 1
PAINLEVEL_OUTOF10: 0 - NO PAIN
PAINLEVEL_OUTOF10: 0 - NO PAIN

## 2025-08-14 ASSESSMENT — LIFESTYLE VARIABLES
SKIP TO QUESTIONS 9-10: 1
HOW OFTEN DO YOU HAVE 6 OR MORE DRINKS ON ONE OCCASION: NEVER
HOW OFTEN DO YOU HAVE A DRINK CONTAINING ALCOHOL: NEVER
HOW MANY STANDARD DRINKS CONTAINING ALCOHOL DO YOU HAVE ON A TYPICAL DAY: PATIENT DOES NOT DRINK
AUDIT-C TOTAL SCORE: 0
AUDIT-C TOTAL SCORE: 0
SKIP TO QUESTIONS 9-10: 1
AUDIT-C TOTAL SCORE: 0

## 2025-08-14 ASSESSMENT — PAIN - FUNCTIONAL ASSESSMENT
PAIN_FUNCTIONAL_ASSESSMENT: 0-10
PAIN_FUNCTIONAL_ASSESSMENT: VAS (VISUAL ANALOG SCALE)
PAIN_FUNCTIONAL_ASSESSMENT: 0-10

## 2025-08-14 ASSESSMENT — PATIENT HEALTH QUESTIONNAIRE - PHQ9
SUM OF ALL RESPONSES TO PHQ9 QUESTIONS 1 & 2: 0
2. FEELING DOWN, DEPRESSED OR HOPELESS: NOT AT ALL
1. LITTLE INTEREST OR PLEASURE IN DOING THINGS: NOT AT ALL

## 2025-08-15 ENCOUNTER — HOSPITAL ENCOUNTER (OUTPATIENT)
Dept: CARDIOLOGY | Facility: HOSPITAL | Age: 80
Discharge: HOME | End: 2025-08-15
Payer: MEDICARE

## 2025-08-15 VITALS
TEMPERATURE: 98.4 F | SYSTOLIC BLOOD PRESSURE: 148 MMHG | HEART RATE: 84 BPM | OXYGEN SATURATION: 100 % | DIASTOLIC BLOOD PRESSURE: 67 MMHG

## 2025-08-15 LAB
ATRIAL RATE: 68 BPM
EJECTION FRACTION: 63 %
P AXIS: 13 DEGREES
P OFFSET: 215 MS
P ONSET: 159 MS
PR INTERVAL: 144 MS
Q ONSET: 231 MS
QRS COUNT: 11 BEATS
QRS DURATION: 72 MS
QT INTERVAL: 388 MS
QTC CALCULATION(BAZETT): 412 MS
QTC FREDERICIA: 404 MS
R AXIS: 84 DEGREES
RIGHT VENTRICLE PEAK SYSTOLIC PRESSURE: 34 MMHG
T AXIS: 42 DEGREES
T OFFSET: 425 MS
VENTRICULAR RATE: 68 BPM

## 2025-08-15 PROCEDURE — 93005 ELECTROCARDIOGRAM TRACING: CPT

## 2025-08-15 ASSESSMENT — COGNITIVE AND FUNCTIONAL STATUS - GENERAL
WALKING IN HOSPITAL ROOM: A LITTLE
MOBILITY SCORE: 22
CLIMB 3 TO 5 STEPS WITH RAILING: A LITTLE
DAILY ACTIVITIY SCORE: 24

## 2025-08-15 ASSESSMENT — PAIN SCALES - GENERAL: PAINLEVEL_OUTOF10: 0 - NO PAIN

## 2025-08-15 ASSESSMENT — PAIN - FUNCTIONAL ASSESSMENT: PAIN_FUNCTIONAL_ASSESSMENT: 0-10

## 2025-08-20 ENCOUNTER — TELEPHONE (OUTPATIENT)
Dept: CARDIOLOGY | Facility: CLINIC | Age: 80
End: 2025-08-20
Payer: MEDICARE

## 2025-08-22 ENCOUNTER — ANCILLARY ORDERS (OUTPATIENT)
Dept: CARDIOLOGY | Facility: CLINIC | Age: 80
End: 2025-08-22

## 2025-08-22 ENCOUNTER — APPOINTMENT (OUTPATIENT)
Dept: CARDIOLOGY | Facility: CLINIC | Age: 80
End: 2025-08-22
Payer: MEDICARE

## 2025-08-22 ENCOUNTER — HOSPITAL ENCOUNTER (OUTPATIENT)
Dept: RADIOLOGY | Facility: HOSPITAL | Age: 80
Discharge: HOME | End: 2025-08-22
Payer: MEDICARE

## 2025-08-22 VITALS
HEIGHT: 63 IN | BODY MASS INDEX: 28.35 KG/M2 | DIASTOLIC BLOOD PRESSURE: 76 MMHG | SYSTOLIC BLOOD PRESSURE: 122 MMHG | HEART RATE: 64 BPM | WEIGHT: 160 LBS

## 2025-08-22 DIAGNOSIS — R94.09 ABNORMAL TILT TABLE TEST: ICD-10-CM

## 2025-08-22 DIAGNOSIS — Z98.890 HISTORY OF CARDIAC RADIOFREQUENCY ABLATION: ICD-10-CM

## 2025-08-22 DIAGNOSIS — T81.718A PSEUDOANEURYSM FOLLOWING PROCEDURE: ICD-10-CM

## 2025-08-22 DIAGNOSIS — I48.91 ATRIAL FIBRILLATION, UNSPECIFIED TYPE (MULTI): ICD-10-CM

## 2025-08-22 DIAGNOSIS — I72.9 PSEUDOANEURYSM FOLLOWING PROCEDURE: ICD-10-CM

## 2025-08-22 DIAGNOSIS — I47.19 PAROXYSMAL ATRIAL TACHYCARDIA: ICD-10-CM

## 2025-08-22 DIAGNOSIS — R09.89 FEMORAL BRUIT: Primary | ICD-10-CM

## 2025-08-22 DIAGNOSIS — R09.89 FEMORAL BRUIT: ICD-10-CM

## 2025-08-22 PROCEDURE — 93926 LOWER EXTREMITY STUDY: CPT

## 2025-08-22 PROCEDURE — 93000 ELECTROCARDIOGRAM COMPLETE: CPT | Performed by: INTERNAL MEDICINE

## 2025-08-22 RX ORDER — FAMOTIDINE 20 MG/1
1 TABLET, FILM COATED ORAL
COMMUNITY
Start: 2025-05-08 | End: 2025-08-22 | Stop reason: WASHOUT

## 2025-08-25 DIAGNOSIS — T81.718A PSEUDOANEURYSM FOLLOWING PROCEDURE: Primary | ICD-10-CM

## 2025-08-25 DIAGNOSIS — I72.9 PSEUDOANEURYSM FOLLOWING PROCEDURE: Primary | ICD-10-CM

## 2025-08-26 ENCOUNTER — APPOINTMENT (OUTPATIENT)
Dept: RADIOLOGY | Facility: HOSPITAL | Age: 80
End: 2025-08-26
Payer: MEDICARE

## 2025-08-26 ENCOUNTER — HOSPITAL ENCOUNTER (OUTPATIENT)
Facility: HOSPITAL | Age: 80
Setting detail: OUTPATIENT SURGERY
Discharge: HOME | End: 2025-08-26
Attending: STUDENT IN AN ORGANIZED HEALTH CARE EDUCATION/TRAINING PROGRAM | Admitting: STUDENT IN AN ORGANIZED HEALTH CARE EDUCATION/TRAINING PROGRAM
Payer: MEDICARE

## 2025-08-26 ENCOUNTER — APPOINTMENT (OUTPATIENT)
Dept: CARDIOLOGY | Facility: HOSPITAL | Age: 80
End: 2025-08-26
Payer: MEDICARE

## 2025-08-26 ENCOUNTER — TELEPHONE (OUTPATIENT)
Dept: VASCULAR SURGERY | Facility: HOSPITAL | Age: 80
End: 2025-08-26
Payer: MEDICARE

## 2025-08-26 DIAGNOSIS — T81.718A PSEUDOANEURYSM FOLLOWING PROCEDURE: Primary | ICD-10-CM

## 2025-08-26 DIAGNOSIS — I72.9 PSEUDOANEURYSM FOLLOWING PROCEDURE: Primary | ICD-10-CM

## 2025-08-26 DIAGNOSIS — I72.4 PSEUDOANEURYSM OF LEFT FEMORAL ARTERY: ICD-10-CM

## 2025-08-26 LAB
ANION GAP SERPL CALC-SCNC: 9 MMOL/L (ref 10–20)
BUN SERPL-MCNC: 13 MG/DL (ref 6–23)
CALCIUM SERPL-MCNC: 8.9 MG/DL (ref 8.6–10.3)
CHLORIDE SERPL-SCNC: 102 MMOL/L (ref 98–107)
CO2 SERPL-SCNC: 26 MMOL/L (ref 21–32)
CREAT SERPL-MCNC: 0.81 MG/DL (ref 0.5–1.05)
EGFRCR SERPLBLD CKD-EPI 2021: 75 ML/MIN/1.73M*2
ERYTHROCYTE [DISTWIDTH] IN BLOOD BY AUTOMATED COUNT: 13 % (ref 11.5–14.5)
GLUCOSE SERPL-MCNC: 94 MG/DL (ref 74–99)
HCT VFR BLD AUTO: 31.6 % (ref 36–46)
HGB BLD-MCNC: 10.5 G/DL (ref 12–16)
MCH RBC QN AUTO: 32.6 PG (ref 26–34)
MCHC RBC AUTO-ENTMCNC: 33.2 G/DL (ref 32–36)
MCV RBC AUTO: 98 FL (ref 80–100)
NRBC BLD-RTO: 0 /100 WBCS (ref 0–0)
PLATELET # BLD AUTO: 359 X10*3/UL (ref 150–450)
POTASSIUM SERPL-SCNC: 4.2 MMOL/L (ref 3.5–5.3)
RBC # BLD AUTO: 3.22 X10*6/UL (ref 4–5.2)
SODIUM SERPL-SCNC: 133 MMOL/L (ref 136–145)
WBC # BLD AUTO: 6.8 X10*3/UL (ref 4.4–11.3)

## 2025-08-26 PROCEDURE — 80048 BASIC METABOLIC PNL TOTAL CA: CPT | Performed by: NURSE PRACTITIONER

## 2025-08-26 PROCEDURE — 7100000001 HC RECOVERY ROOM TIME - INITIAL BASE CHARGE: Performed by: STUDENT IN AN ORGANIZED HEALTH CARE EDUCATION/TRAINING PROGRAM

## 2025-08-26 PROCEDURE — 36002 PSEUDOANEURYSM INJECTION TRT: CPT | Performed by: STUDENT IN AN ORGANIZED HEALTH CARE EDUCATION/TRAINING PROGRAM

## 2025-08-26 PROCEDURE — 93005 ELECTROCARDIOGRAM TRACING: CPT

## 2025-08-26 PROCEDURE — 2500000004 HC RX 250 GENERAL PHARMACY W/ HCPCS (ALT 636 FOR OP/ED): Performed by: NURSE PRACTITIONER

## 2025-08-26 PROCEDURE — 2500000004 HC RX 250 GENERAL PHARMACY W/ HCPCS (ALT 636 FOR OP/ED): Performed by: STUDENT IN AN ORGANIZED HEALTH CARE EDUCATION/TRAINING PROGRAM

## 2025-08-26 PROCEDURE — 7100000010 HC PHASE TWO TIME - EACH INCREMENTAL 1 MINUTE: Performed by: STUDENT IN AN ORGANIZED HEALTH CARE EDUCATION/TRAINING PROGRAM

## 2025-08-26 PROCEDURE — 85027 COMPLETE CBC AUTOMATED: CPT | Performed by: NURSE PRACTITIONER

## 2025-08-26 PROCEDURE — 36415 COLL VENOUS BLD VENIPUNCTURE: CPT | Performed by: NURSE PRACTITIONER

## 2025-08-26 PROCEDURE — 7100000002 HC RECOVERY ROOM TIME - EACH INCREMENTAL 1 MINUTE: Performed by: STUDENT IN AN ORGANIZED HEALTH CARE EDUCATION/TRAINING PROGRAM

## 2025-08-26 PROCEDURE — 93926 LOWER EXTREMITY STUDY: CPT

## 2025-08-26 PROCEDURE — 2720000007 HC OR 272 NO HCPCS: Performed by: STUDENT IN AN ORGANIZED HEALTH CARE EDUCATION/TRAINING PROGRAM

## 2025-08-26 PROCEDURE — 2500000005 HC RX 250 GENERAL PHARMACY W/O HCPCS: Performed by: STUDENT IN AN ORGANIZED HEALTH CARE EDUCATION/TRAINING PROGRAM

## 2025-08-26 PROCEDURE — 7100000009 HC PHASE TWO TIME - INITIAL BASE CHARGE: Performed by: STUDENT IN AN ORGANIZED HEALTH CARE EDUCATION/TRAINING PROGRAM

## 2025-08-26 PROCEDURE — C1894 INTRO/SHEATH, NON-LASER: HCPCS | Performed by: STUDENT IN AN ORGANIZED HEALTH CARE EDUCATION/TRAINING PROGRAM

## 2025-08-26 RX ORDER — SODIUM CHLORIDE 9 MG/ML
50 INJECTION, SOLUTION INTRAVENOUS CONTINUOUS
Status: DISCONTINUED | OUTPATIENT
Start: 2025-08-26 | End: 2025-08-26 | Stop reason: HOSPADM

## 2025-08-26 RX ORDER — LIDOCAINE HYDROCHLORIDE 20 MG/ML
INJECTION, SOLUTION INFILTRATION; PERINEURAL AS NEEDED
Status: DISCONTINUED | OUTPATIENT
Start: 2025-08-26 | End: 2025-08-26 | Stop reason: HOSPADM

## 2025-08-26 RX ADMIN — SODIUM CHLORIDE 50 ML/HR: 9 INJECTION, SOLUTION INTRAVENOUS at 13:45

## 2025-08-26 ASSESSMENT — PAIN SCALES - GENERAL
PAINLEVEL_OUTOF10: 0 - NO PAIN

## 2025-08-26 ASSESSMENT — PAIN - FUNCTIONAL ASSESSMENT
PAIN_FUNCTIONAL_ASSESSMENT: 0-10

## 2025-08-27 VITALS
BODY MASS INDEX: 28.87 KG/M2 | RESPIRATION RATE: 16 BRPM | TEMPERATURE: 97.7 F | HEART RATE: 93 BPM | HEIGHT: 63 IN | OXYGEN SATURATION: 100 % | WEIGHT: 162.92 LBS | SYSTOLIC BLOOD PRESSURE: 173 MMHG | DIASTOLIC BLOOD PRESSURE: 88 MMHG

## 2025-08-27 LAB — BODY SURFACE AREA: 1.81 M2

## 2025-08-28 ENCOUNTER — TELEPHONE (OUTPATIENT)
Dept: CARDIOLOGY | Facility: CLINIC | Age: 80
End: 2025-08-28
Payer: MEDICARE

## 2025-08-31 LAB
ATRIAL RATE: 94 BPM
P AXIS: -2 DEGREES
P OFFSET: 216 MS
P ONSET: 159 MS
PR INTERVAL: 144 MS
Q ONSET: 231 MS
QRS COUNT: 16 BEATS
QRS DURATION: 74 MS
QT INTERVAL: 354 MS
QTC CALCULATION(BAZETT): 442 MS
QTC FREDERICIA: 411 MS
R AXIS: 95 DEGREES
T AXIS: 37 DEGREES
T OFFSET: 408 MS
VENTRICULAR RATE: 94 BPM

## 2025-09-03 ENCOUNTER — APPOINTMENT (OUTPATIENT)
Dept: VASCULAR MEDICINE | Facility: CLINIC | Age: 80
End: 2025-09-03
Payer: MEDICARE

## 2025-09-04 ENCOUNTER — APPOINTMENT (OUTPATIENT)
Dept: VASCULAR SURGERY | Facility: CLINIC | Age: 80
End: 2025-09-04
Payer: MEDICARE

## 2025-10-14 ENCOUNTER — APPOINTMENT (OUTPATIENT)
Dept: CARDIOLOGY | Facility: CLINIC | Age: 80
End: 2025-10-14
Payer: MEDICARE

## 2025-11-06 ENCOUNTER — APPOINTMENT (OUTPATIENT)
Dept: CARDIOLOGY | Facility: CLINIC | Age: 80
End: 2025-11-06
Payer: MEDICARE

## 2025-11-14 ENCOUNTER — APPOINTMENT (OUTPATIENT)
Dept: CARDIOLOGY | Facility: CLINIC | Age: 80
End: 2025-11-14
Payer: MEDICARE

## (undated) DEVICE — ACCESS KIT, S-MAK MINI, 5FR 10CM 0.018IN 40CM, SS/SS, ECHO ENHANCE NEEDLE

## (undated) DEVICE — DRESSING, AQUACEL AG, HYDROFIBER W/SILVER, 3.5 X 3.75 IN

## (undated) DEVICE — WOUND SYSTEM, DEBRIDEMENT & CLEANING, O.R DUOPAK